# Patient Record
Sex: MALE | Race: WHITE | NOT HISPANIC OR LATINO
[De-identification: names, ages, dates, MRNs, and addresses within clinical notes are randomized per-mention and may not be internally consistent; named-entity substitution may affect disease eponyms.]

---

## 2018-06-27 ENCOUNTER — TRANSCRIPTION ENCOUNTER (OUTPATIENT)
Age: 23
End: 2018-06-27

## 2020-10-08 ENCOUNTER — TRANSCRIPTION ENCOUNTER (OUTPATIENT)
Age: 25
End: 2020-10-08

## 2021-02-18 ENCOUNTER — TRANSCRIPTION ENCOUNTER (OUTPATIENT)
Age: 26
End: 2021-02-18

## 2021-05-14 ENCOUNTER — INPATIENT (INPATIENT)
Facility: HOSPITAL | Age: 26
LOS: 7 days | Discharge: HOME HEALTH | End: 2021-05-22
Attending: INTERNAL MEDICINE | Admitting: INTERNAL MEDICINE
Payer: COMMERCIAL

## 2021-05-14 VITALS
TEMPERATURE: 101 F | OXYGEN SATURATION: 96 % | DIASTOLIC BLOOD PRESSURE: 82 MMHG | HEART RATE: 124 BPM | RESPIRATION RATE: 22 BRPM | SYSTOLIC BLOOD PRESSURE: 126 MMHG

## 2021-05-14 DIAGNOSIS — R05 COUGH: ICD-10-CM

## 2021-05-14 DIAGNOSIS — E66.9 OBESITY, UNSPECIFIED: ICD-10-CM

## 2021-05-14 DIAGNOSIS — J45.901 UNSPECIFIED ASTHMA WITH (ACUTE) EXACERBATION: ICD-10-CM

## 2021-05-14 DIAGNOSIS — J12.82 PNEUMONIA DUE TO CORONAVIRUS DISEASE 2019: ICD-10-CM

## 2021-05-14 DIAGNOSIS — U07.1 COVID-19: ICD-10-CM

## 2021-05-14 DIAGNOSIS — R76.0 RAISED ANTIBODY TITER: ICD-10-CM

## 2021-05-14 DIAGNOSIS — J96.01 ACUTE RESPIRATORY FAILURE WITH HYPOXIA: ICD-10-CM

## 2021-05-14 LAB
ALBUMIN SERPL ELPH-MCNC: 3.8 G/DL — SIGNIFICANT CHANGE UP (ref 3.5–5.2)
ALP SERPL-CCNC: 39 U/L — SIGNIFICANT CHANGE UP (ref 30–115)
ALT FLD-CCNC: 23 U/L — SIGNIFICANT CHANGE UP (ref 0–41)
ANION GAP SERPL CALC-SCNC: 14 MMOL/L — SIGNIFICANT CHANGE UP (ref 7–14)
AST SERPL-CCNC: 24 U/L — SIGNIFICANT CHANGE UP (ref 0–41)
BASE EXCESS BLDV CALC-SCNC: 0.1 MMOL/L — SIGNIFICANT CHANGE UP (ref -2–2)
BASOPHILS # BLD AUTO: 0.01 K/UL — SIGNIFICANT CHANGE UP (ref 0–0.2)
BASOPHILS NFR BLD AUTO: 0.1 % — SIGNIFICANT CHANGE UP (ref 0–1)
BILIRUB SERPL-MCNC: 0.3 MG/DL — SIGNIFICANT CHANGE UP (ref 0.2–1.2)
BUN SERPL-MCNC: 13 MG/DL — SIGNIFICANT CHANGE UP (ref 10–20)
CA-I SERPL-SCNC: 1.16 MMOL/L — SIGNIFICANT CHANGE UP (ref 1.12–1.3)
CALCIUM SERPL-MCNC: 8.8 MG/DL — SIGNIFICANT CHANGE UP (ref 8.5–10.1)
CHLORIDE SERPL-SCNC: 103 MMOL/L — SIGNIFICANT CHANGE UP (ref 98–110)
CO2 SERPL-SCNC: 22 MMOL/L — SIGNIFICANT CHANGE UP (ref 17–32)
CREAT SERPL-MCNC: 1 MG/DL — SIGNIFICANT CHANGE UP (ref 0.7–1.5)
D DIMER BLD IA.RAPID-MCNC: 144 NG/ML DDU — SIGNIFICANT CHANGE UP (ref 0–230)
EOSINOPHIL # BLD AUTO: 0 K/UL — SIGNIFICANT CHANGE UP (ref 0–0.7)
EOSINOPHIL NFR BLD AUTO: 0 % — SIGNIFICANT CHANGE UP (ref 0–8)
GAS PNL BLDV: 140 MMOL/L — SIGNIFICANT CHANGE UP (ref 136–145)
GAS PNL BLDV: SIGNIFICANT CHANGE UP
GLUCOSE SERPL-MCNC: 166 MG/DL — HIGH (ref 70–99)
HCO3 BLDV-SCNC: 25 MMOL/L — SIGNIFICANT CHANGE UP (ref 22–29)
HCT VFR BLD CALC: 42.3 % — SIGNIFICANT CHANGE UP (ref 42–52)
HCT VFR BLDA CALC: 47 % — HIGH (ref 34–44)
HGB BLD CALC-MCNC: 15.3 G/DL — SIGNIFICANT CHANGE UP (ref 14–18)
HGB BLD-MCNC: 13.9 G/DL — LOW (ref 14–18)
IMM GRANULOCYTES NFR BLD AUTO: 1.8 % — HIGH (ref 0.1–0.3)
LACTATE BLDV-MCNC: 2.3 MMOL/L — HIGH (ref 0.5–1.6)
LYMPHOCYTES # BLD AUTO: 0.9 K/UL — LOW (ref 1.2–3.4)
LYMPHOCYTES # BLD AUTO: 9.4 % — LOW (ref 20.5–51.1)
MCHC RBC-ENTMCNC: 27.6 PG — SIGNIFICANT CHANGE UP (ref 27–31)
MCHC RBC-ENTMCNC: 32.9 G/DL — SIGNIFICANT CHANGE UP (ref 32–37)
MCV RBC AUTO: 84.1 FL — SIGNIFICANT CHANGE UP (ref 80–94)
MONOCYTES # BLD AUTO: 0.39 K/UL — SIGNIFICANT CHANGE UP (ref 0.1–0.6)
MONOCYTES NFR BLD AUTO: 4.1 % — SIGNIFICANT CHANGE UP (ref 1.7–9.3)
NEUTROPHILS # BLD AUTO: 8.14 K/UL — HIGH (ref 1.4–6.5)
NEUTROPHILS NFR BLD AUTO: 84.6 % — HIGH (ref 42.2–75.2)
NRBC # BLD: 0 /100 WBCS — SIGNIFICANT CHANGE UP (ref 0–0)
NT-PROBNP SERPL-SCNC: 42 PG/ML — SIGNIFICANT CHANGE UP (ref 0–300)
PCO2 BLDV: 40 MMHG — LOW (ref 41–51)
PH BLDV: 7.4 — SIGNIFICANT CHANGE UP (ref 7.26–7.43)
PLATELET # BLD AUTO: 231 K/UL — SIGNIFICANT CHANGE UP (ref 130–400)
PO2 BLDV: 34 MMHG — SIGNIFICANT CHANGE UP (ref 20–40)
POTASSIUM BLDV-SCNC: 3.6 MMOL/L — SIGNIFICANT CHANGE UP (ref 3.3–5.6)
POTASSIUM SERPL-MCNC: 4.1 MMOL/L — SIGNIFICANT CHANGE UP (ref 3.5–5)
POTASSIUM SERPL-SCNC: 4.1 MMOL/L — SIGNIFICANT CHANGE UP (ref 3.5–5)
PROT SERPL-MCNC: 6.4 G/DL — SIGNIFICANT CHANGE UP (ref 6–8)
RBC # BLD: 5.03 M/UL — SIGNIFICANT CHANGE UP (ref 4.7–6.1)
RBC # FLD: 14 % — SIGNIFICANT CHANGE UP (ref 11.5–14.5)
SAO2 % BLDV: 65 % — SIGNIFICANT CHANGE UP
SARS-COV-2 RNA SPEC QL NAA+PROBE: DETECTED
SODIUM SERPL-SCNC: 139 MMOL/L — SIGNIFICANT CHANGE UP (ref 135–146)
WBC # BLD: 9.61 K/UL — SIGNIFICANT CHANGE UP (ref 4.8–10.8)
WBC # FLD AUTO: 9.61 K/UL — SIGNIFICANT CHANGE UP (ref 4.8–10.8)

## 2021-05-14 PROCEDURE — 71045 X-RAY EXAM CHEST 1 VIEW: CPT | Mod: 26

## 2021-05-14 PROCEDURE — 93010 ELECTROCARDIOGRAM REPORT: CPT

## 2021-05-14 PROCEDURE — 99285 EMERGENCY DEPT VISIT HI MDM: CPT

## 2021-05-14 PROCEDURE — 99223 1ST HOSP IP/OBS HIGH 75: CPT

## 2021-05-14 RX ORDER — PANTOPRAZOLE SODIUM 20 MG/1
40 TABLET, DELAYED RELEASE ORAL
Refills: 0 | Status: DISCONTINUED | OUTPATIENT
Start: 2021-05-14 | End: 2021-05-22

## 2021-05-14 RX ORDER — ALBUTEROL 90 UG/1
2 AEROSOL, METERED ORAL
Refills: 0 | Status: DISCONTINUED | OUTPATIENT
Start: 2021-05-14 | End: 2021-05-22

## 2021-05-14 RX ORDER — DEXAMETHASONE 0.5 MG/5ML
6 ELIXIR ORAL DAILY
Refills: 0 | Status: DISCONTINUED | OUTPATIENT
Start: 2021-05-14 | End: 2021-05-17

## 2021-05-14 RX ORDER — BUDESONIDE AND FORMOTEROL FUMARATE DIHYDRATE 160; 4.5 UG/1; UG/1
2 AEROSOL RESPIRATORY (INHALATION)
Refills: 0 | Status: DISCONTINUED | OUTPATIENT
Start: 2021-05-14 | End: 2021-05-22

## 2021-05-14 RX ORDER — SODIUM CHLORIDE 9 MG/ML
2000 INJECTION, SOLUTION INTRAVENOUS ONCE
Refills: 0 | Status: COMPLETED | OUTPATIENT
Start: 2021-05-14 | End: 2021-05-14

## 2021-05-14 RX ORDER — REMDESIVIR 5 MG/ML
200 INJECTION INTRAVENOUS EVERY 24 HOURS
Refills: 0 | Status: COMPLETED | OUTPATIENT
Start: 2021-05-14 | End: 2021-05-15

## 2021-05-14 RX ORDER — ENOXAPARIN SODIUM 100 MG/ML
40 INJECTION SUBCUTANEOUS DAILY
Refills: 0 | Status: DISCONTINUED | OUTPATIENT
Start: 2021-05-14 | End: 2021-05-16

## 2021-05-14 RX ORDER — CHLORHEXIDINE GLUCONATE 213 G/1000ML
1 SOLUTION TOPICAL
Refills: 0 | Status: DISCONTINUED | OUTPATIENT
Start: 2021-05-14 | End: 2021-05-22

## 2021-05-14 RX ORDER — IPRATROPIUM/ALBUTEROL SULFATE 18-103MCG
3 AEROSOL WITH ADAPTER (GRAM) INHALATION
Refills: 0 | Status: COMPLETED | OUTPATIENT
Start: 2021-05-14 | End: 2021-05-14

## 2021-05-14 RX ORDER — SODIUM CHLORIDE 9 MG/ML
1000 INJECTION INTRAMUSCULAR; INTRAVENOUS; SUBCUTANEOUS
Refills: 0 | Status: DISCONTINUED | OUTPATIENT
Start: 2021-05-14 | End: 2021-05-15

## 2021-05-14 RX ORDER — ALBUTEROL 90 UG/1
2 AEROSOL, METERED ORAL
Qty: 0 | Refills: 0 | DISCHARGE

## 2021-05-14 RX ORDER — REMDESIVIR 5 MG/ML
INJECTION INTRAVENOUS
Refills: 0 | Status: COMPLETED | OUTPATIENT
Start: 2021-05-14 | End: 2021-05-19

## 2021-05-14 RX ADMIN — Medication 3 MILLILITER(S): at 15:32

## 2021-05-14 RX ADMIN — SODIUM CHLORIDE 2000 MILLILITER(S): 9 INJECTION, SOLUTION INTRAVENOUS at 15:32

## 2021-05-14 RX ADMIN — Medication 125 MILLIGRAM(S): at 15:32

## 2021-05-14 NOTE — H&P ADULT - HISTORY OF PRESENT ILLNESS
25 years old male with a pmhx of asthma, on day 9 of Covid symptoms presents today for eval of worsening SOB x 4 days. Pt has been using rescue inhaler as needed with minimal to no relief. Pt denies fever, chills, weakness, numbness, CP, ABD Pain, n/v/d.    In the ED, temp 101.4, , /82, RR 22 saturating well on room air. Before being discharged from ED, [id number] desaturated to 86% placed on 6L NC. Labs WNL, covid +, to be admitted under medicine.  25 years old male with a pmhx of asthma, on day 9 of Covid symptoms presents today for eval of worsening SOB for past 4 days.   History of presenting illness goes back to 1.5 weeks ago when patient developed fever, highets temp recored 101.2, pt underwent covid testing and was found to be COVID + last week. For past four days has developed progressively worsening SOB, which is imppacting ADL prompting the patient to visit ED. Also reports chills, myalgias and body aches. Pt has been using rescue inhaler as needed with minimal to no relief. Pt denies fever, chills, weakness, numbness, CP, ABD Pain, n/v/d.    In the ED, temp 101.4, , /82, RR 22 saturating well on room air. Before being discharged from ED, [id number] desaturated to 86% placed on 6L NC. Labs WNL, covid +, to be admitted under medicine.

## 2021-05-14 NOTE — ED PROVIDER NOTE - NS ED ROS FT
Eyes:  No visual changes, eye pain or discharge.  ENMT:  No hearing changes, pain, discharge or infections. No neck pain or stiffness.  Cardiac:  No chest pain, SOB or edema. No chest pain with exertion.  Respiratory:  + asthma + SOB + Cough  GI:  No nausea, vomiting, diarrhea or abdominal pain.  :  No dysuria, frequency or burning.  MS:  No myalgia, muscle weakness, joint pain or back pain.  Neuro:  No headache or weakness.  No LOC.  Skin:  No skin rash.   Endocrine: No history of thyroid disease or diabetes.  Except as documented in the HPI,  all other systems are negative.

## 2021-05-14 NOTE — ED PROVIDER NOTE - OBJECTIVE STATEMENT
Pt is a 24y/o male with a pmhx of asthma, on day 9 of Covid symptoms presents today for eval of worsening SOB x 4 days. Pt has been using rescue inhaler as needed with minimal to no relief. Pt denies fever, chills, weakness, numbness, CP, ABD Pain, n/v/d.

## 2021-05-14 NOTE — ED PROVIDER NOTE - CLINICAL SUMMARY MEDICAL DECISION MAKING FREE TEXT BOX
25 y.o M w/ pmhx asthma and currently with known COVID PNA p/w worsening SOB x 4 days concerning for viral induced asthma exacerbation vs hypoxemic respiratory failure 2/2 COVID PNA. Initially pt treated for asthma exacerbation with solu-medrol and duonebs. After 3 nebs pt feeling better but pulse ox showing <90% on RA. Pt placed on 6 L O2 and now at 91%. In light of CXR and symptoms with signs of hypoexmia, will draw labs and admit pt for worsening covid PNA. Pt amenable to plan. Pt endorsed to admitting team.

## 2021-05-14 NOTE — H&P ADULT - ATTENDING COMMENTS
HPI:  25 years old male with a pmhx of asthma, on day 9 of Covid symptoms presents today for eval of worsening SOB for past 4 days.   History of presenting illness goes back to 1.5 weeks ago when patient developed fever, highets temp recored 101.2, pt underwent covid testing and was found to be COVID + last week. For past four days has developed progressively worsening SOB, which is imppacting ADL prompting the patient to visit ED. Also reports chills, myalgias and body aches. Pt has been using rescue inhaler as needed with minimal to no relief. Pt denies fever, chills, weakness, numbness, CP, ABD Pain, n/v/d.    In the ED, temp 101.4, , /82, RR 22 saturating well on room air. Before being discharged from ED, [id number] desaturated to 86% placed on 6L NC. Labs WNL, covid +, to be admitted under medicine.  (14 May 2021 21:00)    REVIEW OF SYSTEMS: see cc/HPI   CONSTITUTIONAL: No weakness, fevers or chills  EYES/ENT: No visual changes;  No vertigo or throat pain   NECK: No pain or stiffness  RESPIRATORY: No cough, wheezing, hemoptysis; No shortness of breath  CARDIOVASCULAR: No chest pain or palpitations  GASTROINTESTINAL: No abdominal or epigastric pain. No nausea, vomiting, or hematemesis; No diarrhea or constipation. No melena or hematochezia.  GENITOURINARY: No dysuria, frequency or hematuria  NEUROLOGICAL: No numbness or weakness  SKIN: No itching, rashes    Physical Exam:  General: WN/WD NAD  Neurology: A&Ox3, nonfocal, follows commands  Eyes: PERRLA/ EOMI  ENT/Neck: Neck supple, trachea midline, No JVD  Respiratory: CTA B/L, No wheezing, rales, rhonchi  CV: Normal rate regular rhythm, S1S2, no murmurs, rubs or gallops  Abdominal: Soft, NT, ND +BS,   Extremities: No edema, + peripheral pulses  Skin: No Rashes, Hematoma, Ecchymosis  Incisions:   Tubes:  A/p  Severe COVID-19 infection   Acute resp. failure w/ hypoxia 2/2 COVID-19   Asthma exacerbation   -admit to floor   -O2 via NC or Hi flow PRN   - pulse ox monitoring   -IV decadron and remdesivir   -fever control / Tylenol   -send inflammatory marker   -ID eval   -c/w inhaler     DVT prophylaxis HPI:  25 years old male with a pmhx of asthma, on day 9 of Covid symptoms presents today for eval of worsening SOB for past 4 days.   History of presenting illness goes back to 1.5 weeks ago when patient developed fever, highets temp recored 101.2, pt underwent covid testing and was found to be COVID + last week. For past four days has developed progressively worsening SOB, which is imppacting ADL prompting the patient to visit ED. Also reports chills, myalgias and body aches. Pt has been using rescue inhaler as needed with minimal to no relief. Pt denies fever, chills, weakness, numbness, CP, ABD Pain, n/v/d.    In the ED, temp 101.4, , /82, RR 22 saturating well on room air. Before being discharged from ED, [id number] desaturated to 86% placed on 6L NC. Labs WNL, covid +, to be admitted under medicine.  (14 May 2021 21:00)    REVIEW OF SYSTEMS: see cc/HPI   CONSTITUTIONAL: No weakness, fevers or chills  EYES/ENT: No visual changes;  No vertigo or throat pain   NECK: No pain or stiffness  RESPIRATORY: (+) cough, (+) wheezing, NO hemoptysis; (+) shortness of breath  CARDIOVASCULAR: No chest pain or palpitations  GASTROINTESTINAL: No abdominal or epigastric pain. No nausea, vomiting, or hematemesis; No diarrhea or constipation. No melena or hematochezia.  GENITOURINARY: No dysuria, frequency or hematuria  NEUROLOGICAL: No numbness or weakness  SKIN: No itching, rashes    Physical Exam:  General: WN/WD NAD  Neurology: A&Ox3, nonfocal, follows commands  Eyes: PERRLA/ EOMI  ENT/Neck: Neck supple, trachea midline, No JVD  Respiratory: B/L scattered wheezing, NO rales, rhonchi, (+) coughing   CV: Normal rate regular rhythm, S1S2, no murmurs, rubs or gallops  Abdominal: Soft, NT, ND +BS,   Extremities: No edema, + peripheral pulses  Skin: No Rashes, Hematoma, Ecchymosis  Incisions:   Tubes:  A/p  Severe COVID-19 infection   Acute resp. failure w/ hypoxia 2/2 COVID-19   Asthma exacerbation   -admit to floor   -O2 via NC or Hi flow PRN   - pulse ox monitoring   -IV decadron and Remdesivir   -fever control / Tylenol   -send inflammatory marker   -ID eval   -c/w inhaler     DVT prophylaxis    Seen 5/14/21

## 2021-05-14 NOTE — H&P ADULT - NSHPPHYSICALEXAM_GEN_ALL_CORE
PHYSICAL EXAM:  GENERAL: NAD, lying in bed comfortably  HEAD:  Atraumatic, Normocephalic  EYES: EOMI, PERRLA, conjunctiva and sclera clear  ENT: Moist mucous membranes  NECK: Supple, No JVD  CHEST/LUNG: Clear to auscultation bilaterally; No rales, rhonchi, wheezing, or rubs. Unlabored respirations  HEART: Regular rate and rhythm; No murmurs, rubs, or gallops  ABDOMEN: Bowel sounds present; Soft, Nontender, Nondistended. No hepatomegally  EXTREMITIES:  2+ Peripheral Pulses, brisk capillary refill. No clubbing, cyanosis, or edema  NERVOUS SYSTEM:  Alert & Oriented X3, speech clear. No deficits   MSK: FROM all 4 extremities, full and equal strength  SKIN: No rashes or lesions PHYSICAL EXAM:  GENERAL: NAD, lying in bed comfortably  HEAD:  Atraumatic, Normocephalic  EYES: EOMI, PERRLA, conjunctiva and sclera clear  ENT: Moist mucous membranes  NECK: Supple, No JVD  CHEST/LUNG: mild wheezing b/l  HEART: Regular rate and rhythm; No murmurs, rubs, or gallops  ABDOMEN: Bowel sounds present; Soft, Nontender, Nondistended. No hepatomegally  EXTREMITIES:  2+ Peripheral Pulses, brisk capillary refill. No clubbing, cyanosis, or edema  NERVOUS SYSTEM:  Alert & Oriented X3, speech clear. No deficits   MSK: FROM all 4 extremities, full and equal strength  SKIN: No rashes or lesions

## 2021-05-14 NOTE — ED ADULT NURSE NOTE - NSIMPLEMENTINTERV_GEN_ALL_ED
Implemented All Universal Safety Interventions:  [place] to call system. Call bell, personal items and telephone within reach. Instruct patient to call for assistance. Room bathroom lighting operational. Non-slip footwear when patient is off stretcher. Physically safe environment: no spills, clutter or unnecessary equipment. Stretcher in lowest position, wheels locked, appropriate side rails in place.

## 2021-05-14 NOTE — ED ADULT NURSE REASSESSMENT NOTE - NS ED NURSE REASSESS COMMENT FT1
Received the pt from the previous shift. A and O x3, sitting on the chair, NC 6 L , Sat 93%. No acute distress noted. Fall precautions and safety measurements maintained.

## 2021-05-14 NOTE — H&P ADULT - ASSESSMENT
Impression:  Severe Covid PNA  Allyson exacerbation    # SOB / moderate - severe COVID - 19 PNA  - CXR shows b/l pulmonary opacities  - D-Dimer and LDH WNL for now  - monitor O2, titrate O2 as needed to goal of 92 - 96%  - decadron 6mg IV upto 10 days  - remdesivir 200mg IV x 1 day followed by 100mg IV x 4 days  - empiric abx if sepsis  - anticoagulation  - tylenol for fever  - toci? plasma if less than 7 days from symptoms onset.   - trend D-Dimer, CRP, LDH, ferritin     # Diet: Regular  # DVT Prophylaxis: Lovenox  # GI Prophylaxis::  # Activity: IAT  # IV Fluids:  # Dispo: Acute  # Code Status: Fullcode  # CHG wash  25 years old male with a pmhx of asthma, on day 9 of Covid symptoms presents today for eval of worsening SOB for past 4 days.     Impression:  Severe Covid PNA  Acute Asthma exacerbation    # SOB / moderate - severe COVID - 19 PNA  # Acute asthma exacerbation  - CXR shows b/l pulmonary opacities  - D-Dimer WNL for now  - monitor O2, titrate O2 as needed to goal of 92 - 96%  - decadron 6mg IV upto 10 days  - remdesivir 200mg IV x 1 day followed by 100mg IV x 4 days  - anticoagulation: lovenox 40 sq qd  - tylenol for fever  - consult ID for toci vs plasma? check covid ab levels  - c/w inhalers  - trend D-Dimer, CRP, LDH, ferritin     # Diet: Regular  # DVT Prophylaxis: Lovenox  # GI Prophylaxis: Protonix  # Activity: IAT  # IV Fluids: NS @ 75cc  # Dispo: Acute  # Code Status: Fullcode

## 2021-05-14 NOTE — ED PROVIDER NOTE - PHYSICAL EXAMINATION
VITAL SIGNS: I have reviewed nursing notes and confirm.  CONSTITUTIONAL: Well-developed; well-nourished; in no acute distress.   SKIN: skin exam is warm and dry, no acute rash.    HEAD: Normocephalic; atraumatic.  EYES: conjunctiva and sclera clear.  ENT: No nasal discharge; airway clear.  NECK: Supple; non tender.  CARD: S1, S2 normal; no murmurs, gallops, or rubs. Regular rate and rhythm.   RESP: + wheezes diffusely   ABD: Normal bowel sounds; soft; non-distended; non-tender  EXT: Normal ROM.  No clubbing, cyanosis or edema.   LYMPH: No acute cervical adenopathy.  NEURO: Alert, oriented, grossly unremarkable  PSYCH: Cooperative, appropriate.

## 2021-05-14 NOTE — ED PROVIDER NOTE - CARE PLAN
Principal Discharge DX:	COVID-19  Secondary Diagnosis:	Dyspnea  Secondary Diagnosis:	Asthma   Principal Discharge DX:	Acute respiratory failure with hypoxia  Secondary Diagnosis:	Dyspnea  Secondary Diagnosis:	Asthma  Secondary Diagnosis:	COVID-19

## 2021-05-14 NOTE — H&P ADULT - NSHPLABSRESULTS_GEN_ALL_CORE
(05-14 @ 18:10)                      13.9  9.61 )-----------( 231                 42.3    Neutrophils = 8.14 (84.6%)  Lymphocytes = 0.90 (9.4%)  Eosinophils = 0.00 (0.0%)  Basophils = 0.01 (0.1%)  Monocytes = 0.39 (4.1%)  Bands = --%    05-14    139  |  103  |  13  ----------------------------<  166<H>  4.1   |  22  |  1.0    Ca    8.8      14 May 2021 18:10    TPro  6.4  /  Alb  3.8  /  TBili  0.3  /  DBili  x   /  AST  24  /  ALT  23  /  AlkPhos  39  05-14          RVP:    Venous Blood Gas:  05-14 @ 18:16  7.40/40/34/25/65  VBG Lactate: 2.3        Tox:

## 2021-05-14 NOTE — ED PROVIDER NOTE - PROGRESS NOTE DETAILS
pt given 3 Combivent with minimal relief, now  requiring 6l nc, currently saturating at 95%, HR improved, will admit to medicine discussed case fiorella CARRERO, Will admit

## 2021-05-14 NOTE — ED PROVIDER NOTE - ATTENDING CONTRIBUTION TO CARE
25 y.o M w/ pmhx asthma p/w worsening SOB x 4 days. Pt was recently diagnosed with COVID and is on day 9 of viral syndrome. SOB similar to his prior asthma exacerbations which he has been treating with albuterol with minimal relief. No + fever, no CP, no n/v, + cough, no dizziness, no abd pain, no urinary symptoms, no constipation or diarrhea.    VS reviewed  CONSTITUTIONAL: well-appearing with slight effort in breathing.  SKIN: Warm dry, normal skin turgor  HEAD: NCAT  EYES: EOMI, PERRL, no scleral icterus, conjunctiva pink  ENT: normal pharynx with no erythema or exudates  NECK: Supple; non tender. Full ROM.  CARD: tachy, no murmurs.  RESP: L sided diffuse crackles. Slightly tachypneic to low 20s, saturating 91% on RA.  ABD: soft, non-tender, non-distended, no rebound or guarding.  EXT: Full ROM, no bony tenderness, no pedal edema, no calf tenderness  NEURO: normal motor. normal sensory. Normal gait.  PSYCH: Cooperative, appropriate.    Plan: steroids, duoneb and reassess. I personally evaluated the patient. I reviewed the Resident’s or Physician Assistant’s note (as assigned above), and agree with the findings and plan except as documented in my note.    25 y.o M w/ pmhx asthma p/w worsening SOB x 4 days. Pt was recently diagnosed with COVID and is on day 9 of viral syndrome. SOB similar to his prior asthma exacerbations which he has been treating with albuterol with minimal relief. No + fever, no CP, no n/v, + cough, no dizziness, no abd pain, no urinary symptoms, no constipation or diarrhea.    VS reviewed  CONSTITUTIONAL: well-appearing with slight effort in breathing.  SKIN: Warm dry, normal skin turgor  HEAD: NCAT  EYES: EOMI, PERRL, no scleral icterus, conjunctiva pink  ENT: normal pharynx with no erythema or exudates  NECK: Supple; non tender. Full ROM.  CARD: tachy, no murmurs.  RESP: L sided diffuse crackles. Slightly tachypneic to low 20s, saturating 91% on RA.  ABD: soft, non-tender, non-distended, no rebound or guarding.  EXT: Full ROM, no bony tenderness, no pedal edema, no calf tenderness  NEURO: normal motor. normal sensory. Normal gait.  PSYCH: Cooperative, appropriate.    Plan: steroids, duoneb and reassess.

## 2021-05-15 LAB
A1C WITH ESTIMATED AVERAGE GLUCOSE RESULT: 5.9 % — HIGH (ref 4–5.6)
ALBUMIN SERPL ELPH-MCNC: 3.7 G/DL — SIGNIFICANT CHANGE UP (ref 3.5–5.2)
ALP SERPL-CCNC: 40 U/L — SIGNIFICANT CHANGE UP (ref 30–115)
ALT FLD-CCNC: 22 U/L — SIGNIFICANT CHANGE UP (ref 0–41)
ANION GAP SERPL CALC-SCNC: 14 MMOL/L — SIGNIFICANT CHANGE UP (ref 7–14)
APTT BLD: 28.4 SEC — SIGNIFICANT CHANGE UP (ref 27–39.2)
AST SERPL-CCNC: 19 U/L — SIGNIFICANT CHANGE UP (ref 0–41)
BASOPHILS # BLD AUTO: 0.02 K/UL — SIGNIFICANT CHANGE UP (ref 0–0.2)
BASOPHILS NFR BLD AUTO: 0.2 % — SIGNIFICANT CHANGE UP (ref 0–1)
BILIRUB SERPL-MCNC: 0.3 MG/DL — SIGNIFICANT CHANGE UP (ref 0.2–1.2)
BUN SERPL-MCNC: 14 MG/DL — SIGNIFICANT CHANGE UP (ref 10–20)
CALCIUM SERPL-MCNC: 8.8 MG/DL — SIGNIFICANT CHANGE UP (ref 8.5–10.1)
CHLORIDE SERPL-SCNC: 102 MMOL/L — SIGNIFICANT CHANGE UP (ref 98–110)
CHOLEST SERPL-MCNC: 148 MG/DL — SIGNIFICANT CHANGE UP
CO2 SERPL-SCNC: 22 MMOL/L — SIGNIFICANT CHANGE UP (ref 17–32)
COVID-19 SPIKE DOMAIN AB INTERP: POSITIVE
COVID-19 SPIKE DOMAIN ANTIBODY RESULT: 2.15 U/ML — HIGH
CREAT SERPL-MCNC: 0.8 MG/DL — SIGNIFICANT CHANGE UP (ref 0.7–1.5)
CRP SERPL-MCNC: 114 MG/L — HIGH
CRP SERPL-MCNC: 80 MG/L — HIGH
D DIMER BLD IA.RAPID-MCNC: 222 NG/ML DDU — SIGNIFICANT CHANGE UP (ref 0–230)
EOSINOPHIL # BLD AUTO: 0 K/UL — SIGNIFICANT CHANGE UP (ref 0–0.7)
EOSINOPHIL NFR BLD AUTO: 0 % — SIGNIFICANT CHANGE UP (ref 0–8)
ESTIMATED AVERAGE GLUCOSE: 123 MG/DL — HIGH (ref 68–114)
FERRITIN SERPL-MCNC: 508 NG/ML — HIGH (ref 30–400)
FOLATE SERPL-MCNC: 12 NG/ML — SIGNIFICANT CHANGE UP
GLUCOSE SERPL-MCNC: 168 MG/DL — HIGH (ref 70–99)
HCT VFR BLD CALC: 40.7 % — LOW (ref 42–52)
HDLC SERPL-MCNC: 26 MG/DL — LOW
HGB BLD-MCNC: 13.7 G/DL — LOW (ref 14–18)
IMM GRANULOCYTES NFR BLD AUTO: 1.8 % — HIGH (ref 0.1–0.3)
INR BLD: 1.37 RATIO — HIGH (ref 0.65–1.3)
LACTATE SERPL-SCNC: 2.1 MMOL/L — HIGH (ref 0.7–2)
LIPID PNL WITH DIRECT LDL SERPL: 95 MG/DL — SIGNIFICANT CHANGE UP
LYMPHOCYTES # BLD AUTO: 1.01 K/UL — LOW (ref 1.2–3.4)
LYMPHOCYTES # BLD AUTO: 9.1 % — LOW (ref 20.5–51.1)
MAGNESIUM SERPL-MCNC: 1.8 MG/DL — SIGNIFICANT CHANGE UP (ref 1.8–2.4)
MCHC RBC-ENTMCNC: 28.4 PG — SIGNIFICANT CHANGE UP (ref 27–31)
MCHC RBC-ENTMCNC: 33.7 G/DL — SIGNIFICANT CHANGE UP (ref 32–37)
MCV RBC AUTO: 84.4 FL — SIGNIFICANT CHANGE UP (ref 80–94)
MONOCYTES # BLD AUTO: 0.49 K/UL — SIGNIFICANT CHANGE UP (ref 0.1–0.6)
MONOCYTES NFR BLD AUTO: 4.4 % — SIGNIFICANT CHANGE UP (ref 1.7–9.3)
NEUTROPHILS # BLD AUTO: 9.38 K/UL — HIGH (ref 1.4–6.5)
NEUTROPHILS NFR BLD AUTO: 84.5 % — HIGH (ref 42.2–75.2)
NON HDL CHOLESTEROL: 122 MG/DL — SIGNIFICANT CHANGE UP
NRBC # BLD: 0 /100 WBCS — SIGNIFICANT CHANGE UP (ref 0–0)
PLATELET # BLD AUTO: 233 K/UL — SIGNIFICANT CHANGE UP (ref 130–400)
POTASSIUM SERPL-MCNC: 3.9 MMOL/L — SIGNIFICANT CHANGE UP (ref 3.5–5)
POTASSIUM SERPL-SCNC: 3.9 MMOL/L — SIGNIFICANT CHANGE UP (ref 3.5–5)
PROCALCITONIN SERPL-MCNC: 0.12 NG/ML — HIGH (ref 0.02–0.1)
PROCALCITONIN SERPL-MCNC: 0.13 NG/ML — HIGH (ref 0.02–0.1)
PROT SERPL-MCNC: 6.2 G/DL — SIGNIFICANT CHANGE UP (ref 6–8)
PROTHROM AB SERPL-ACNC: 15.8 SEC — HIGH (ref 9.95–12.87)
RBC # BLD: 4.82 M/UL — SIGNIFICANT CHANGE UP (ref 4.7–6.1)
RBC # FLD: 13.9 % — SIGNIFICANT CHANGE UP (ref 11.5–14.5)
SARS-COV-2 IGG+IGM SERPL QL IA: 2.15 U/ML — HIGH
SARS-COV-2 IGG+IGM SERPL QL IA: POSITIVE
SODIUM SERPL-SCNC: 138 MMOL/L — SIGNIFICANT CHANGE UP (ref 135–146)
TRIGL SERPL-MCNC: 117 MG/DL — SIGNIFICANT CHANGE UP
TROPONIN T SERPL-MCNC: <0.01 NG/ML — SIGNIFICANT CHANGE UP
TSH SERPL-MCNC: 0.17 UIU/ML — LOW (ref 0.27–4.2)
VIT B12 SERPL-MCNC: 531 PG/ML — SIGNIFICANT CHANGE UP (ref 232–1245)
WBC # BLD: 11.1 K/UL — HIGH (ref 4.8–10.8)
WBC # FLD AUTO: 11.1 K/UL — HIGH (ref 4.8–10.8)

## 2021-05-15 PROCEDURE — 71045 X-RAY EXAM CHEST 1 VIEW: CPT | Mod: 26

## 2021-05-15 PROCEDURE — 99232 SBSQ HOSP IP/OBS MODERATE 35: CPT

## 2021-05-15 RX ORDER — ACETAMINOPHEN 500 MG
650 TABLET ORAL EVERY 6 HOURS
Refills: 0 | Status: DISCONTINUED | OUTPATIENT
Start: 2021-05-15 | End: 2021-05-22

## 2021-05-15 RX ORDER — REMDESIVIR 5 MG/ML
100 INJECTION INTRAVENOUS EVERY 24 HOURS
Refills: 0 | Status: COMPLETED | OUTPATIENT
Start: 2021-05-16 | End: 2021-05-19

## 2021-05-15 RX ORDER — IBUPROFEN 200 MG
400 TABLET ORAL EVERY 6 HOURS
Refills: 0 | Status: DISCONTINUED | OUTPATIENT
Start: 2021-05-15 | End: 2021-05-15

## 2021-05-15 RX ORDER — GUAIFENESIN/DEXTROMETHORPHAN 600MG-30MG
10 TABLET, EXTENDED RELEASE 12 HR ORAL EVERY 6 HOURS
Refills: 0 | Status: COMPLETED | OUTPATIENT
Start: 2021-05-15 | End: 2021-05-20

## 2021-05-15 RX ORDER — IBUPROFEN 200 MG
600 TABLET ORAL ONCE
Refills: 0 | Status: COMPLETED | OUTPATIENT
Start: 2021-05-15 | End: 2021-05-15

## 2021-05-15 RX ADMIN — ENOXAPARIN SODIUM 40 MILLIGRAM(S): 100 INJECTION SUBCUTANEOUS at 11:00

## 2021-05-15 RX ADMIN — Medication 10 MILLILITER(S): at 09:25

## 2021-05-15 RX ADMIN — Medication 650 MILLIGRAM(S): at 21:43

## 2021-05-15 RX ADMIN — Medication 650 MILLIGRAM(S): at 08:06

## 2021-05-15 RX ADMIN — Medication 6 MILLIGRAM(S): at 05:06

## 2021-05-15 RX ADMIN — PANTOPRAZOLE SODIUM 40 MILLIGRAM(S): 20 TABLET, DELAYED RELEASE ORAL at 05:07

## 2021-05-15 RX ADMIN — Medication 650 MILLIGRAM(S): at 02:10

## 2021-05-15 RX ADMIN — Medication 10 MILLILITER(S): at 17:19

## 2021-05-15 RX ADMIN — BUDESONIDE AND FORMOTEROL FUMARATE DIHYDRATE 2 PUFF(S): 160; 4.5 AEROSOL RESPIRATORY (INHALATION) at 08:13

## 2021-05-15 RX ADMIN — Medication 600 MILLIGRAM(S): at 09:25

## 2021-05-15 RX ADMIN — Medication 650 MILLIGRAM(S): at 08:56

## 2021-05-15 RX ADMIN — SODIUM CHLORIDE 75 MILLILITER(S): 9 INJECTION INTRAMUSCULAR; INTRAVENOUS; SUBCUTANEOUS at 00:41

## 2021-05-15 RX ADMIN — Medication 650 MILLIGRAM(S): at 01:15

## 2021-05-15 RX ADMIN — REMDESIVIR 500 MILLIGRAM(S): 5 INJECTION INTRAVENOUS at 05:07

## 2021-05-15 NOTE — CONSULT NOTE ADULT - ASSESSMENT
IMPRESSION:  Acute hypoxemic respiratory failure  COVID-19 PNA  Doubt superimposed bacterial infection  Doubt volume overload  Doubt DVT/ PE    PLAN:    CNS: Avoid CNS depressants.     HEENT: Oral care    PULMONARY:  HOB @ 45 degrees.  Aspiration precautions. Target SpO2 92-96%, wean oxygen as tolerated. Continue decadron.     CARDIOVASCULAR: Avoid volume overload.     GI: GI prophylaxis. Feeding as tolerated.  Bowel regimen     RENAL: Follow up lytes. Correct as needed.     INFECTIOUS DISEASE: PanCx. Nasal MRSA, urine strep/ legionella. Trend inflammatory markers. FU ID.     HEMATOLOGICAL:  DVT prophylaxis.     ENDOCRINE:  Follow up FS.     MUSCULOSKELETAL: bedrest     Step Down Unit monitoring for now IMPRESSION:  Acute hypoxemic respiratory failure  COVID-19 PNA  Doubt superimposed bacterial infection  HO Obesity  HO Asthma, stable  Doubt volume overload  Doubt DVT/ PE    PLAN:    CNS: Avoid CNS depressants.     HEENT: Oral care    PULMONARY:  HOB @ 45 degrees.  Aspiration precautions. Target SpO2 92-96%, wean oxygen as tolerated. Continue decadron. Duonebs PRN.     CARDIOVASCULAR: Avoid volume overload.     GI: GI prophylaxis. Feeding as tolerated.  Bowel regimen     RENAL: Follow up lytes. Correct as needed.     INFECTIOUS DISEASE: PanCx. Nasal MRSA, urine strep/ legionella. Trend inflammatory markers. FU ID.     HEMATOLOGICAL:  DVT prophylaxis.     ENDOCRINE:  Follow up FS.     MUSCULOSKELETAL: bedrest     MICU monitoring

## 2021-05-15 NOTE — PROGRESS NOTE ADULT - ATTENDING COMMENTS
Chart reviewed.  Patient examined and discussed with housestaff.  F/up of this 24 yo M with hx of asthma currently admitted and being txed for severe covid-19 PNA with acute hypoxic respiratory failure and b/l infiltrates on CXR along with increase ESR / CRP.  + Fever noted and currently on 100% NRB mask with oxygen saturation in the low 90's.  Pt though does not look to be in distress.  PE as noted above.  Labs / CXR reviewed and discussed.  Agree with transfer to ICU for closer monitoring.  Appreciate ID and pulm input.  To continue IV Dexa and Remdesivir.  Resp monitoring.  DVT prophylaxis.  F/up abn labs.

## 2021-05-15 NOTE — CONSULT NOTE ADULT - SUBJECTIVE AND OBJECTIVE BOX
Patient is a 25y old  Male who presents with a chief complaint of Shortness of breath (15 May 2021 05:39)      HPI:  25 years old male with a pmhx of asthma, on day 9 of Covid symptoms presents today for eval of worsening SOB for past 4 days.   History of presenting illness goes back to 1.5 weeks ago when patient developed fever, highets temp recored 101.2, pt underwent covid testing and was found to be COVID + last week. For past four days has developed progressively worsening SOB, which is imppacting ADL prompting the patient to visit ED. Also reports chills, myalgias and body aches. Pt has been using rescue inhaler as needed with minimal to no relief. Pt denies fever, chills, weakness, numbness, CP, ABD Pain, n/v/d.    In the ED, temp 101.4, , /82, RR 22 saturating well on room air. Before being discharged from ED, [id number] desaturated to 86% placed on 6L NC. Labs WNL, covid +, to be admitted under medicine.  (14 May 2021 21:00)      PAST MEDICAL & SURGICAL HISTORY:  Asthma      FAMILY HISTORY:  :  No known cardiovacular family hisotry     Review Of Systems:     All ROS are negative except per HPI       Allergies    No Known Allergies    Intolerances          PHYSICAL EXAM    ICU Vital Signs Last 24 Hrs  T(C): 38 (15 May 2021 08:05), Max: 39 (15 May 2021 01:11)  T(F): 100.4 (15 May 2021 08:05), Max: 102.2 (15 May 2021 01:11)  HR: 107 (15 May 2021 05:39) (106 - 127)  BP: 146/67 (15 May 2021 05:39) (121/60 - 146/67)  RR: 20 (15 May 2021 05:39) (20 - 22)  SpO2: 94% (15 May 2021 08:05) (90% - 96%)      CONSTITUTIONAL:  Well nourished.  NAD    ENT:   Airway patent,   Mouth with normal mucosa.   No thrush    EYES:   pupils equal,   round and reactive to light.    CARDIAC:   Normal rate,   Regular rhythm.    Heart sounds S1, S2.     RESPIRATORY:   NRB  No wheezing   Normal chest expansion  No use of accessory muscles    GASTROINTESTINAL:  Abdomen soft   Non-tender,   No guarding,   + BS    MUSCULOSKELETAL:   Range of motion is not limited,  No clubbing, cyanosis    NEUROLOGICAL:   Alert and oriented   No motor or sensory deficits.  Pertinent DTRs normal    SKIN:   Skin normal color for race,   Warm and dry  No evidence of rash.    PSYCHIATRIC:   Normal mood and affect.   No apparent risk to self or others.        LABS:                          13.9   9.61  )-----------( 231      ( 14 May 2021 18:10 )             42.3                                               05-14    139  |  103  |  13  ----------------------------<  166<H>  4.1   |  22  |  1.0    Ca    8.8      14 May 2021 18:10    TPro  6.4  /  Alb  3.8  /  TBili  0.3  /  DBili  x   /  AST  24  /  ALT  23  /  AlkPhos  39  05-14                                                                                           LIVER FUNCTIONS - ( 14 May 2021 18:10 )  Alb: 3.8 g/dL / Pro: 6.4 g/dL / ALK PHOS: 39 U/L / ALT: 23 U/L / AST: 24 U/L / GGT: x                                                                                                                                       X-Rays reviewed:                                                                                    ECHO    CXR interpreted by me: bilateral opacities    MEDICATIONS  (STANDING):  albuterol/ipratropium for Nebulization.. 3 milliLiter(s) Nebulizer every 20 minutes  budesonide  80 MICROgram(s)/formoterol 4.5 MICROgram(s) Inhaler 2 Puff(s) Inhalation two times a day  chlorhexidine 4% Liquid 1 Application(s) Topical <User Schedule>  dexAMETHasone  Injectable 6 milliGRAM(s) IV Push daily  enoxaparin Injectable 40 milliGRAM(s) SubCutaneous daily  pantoprazole    Tablet 40 milliGRAM(s) Oral before breakfast  remdesivir  IVPB   IV Intermittent   sodium chloride 0.9%. 1000 milliLiter(s) (75 mL/Hr) IV Continuous <Continuous>    MEDICATIONS  (PRN):  acetaminophen   Tablet .. 650 milliGRAM(s) Oral every 6 hours PRN Temp greater or equal to 38C (100.4F), Moderate Pain (4 - 6)  ALBUTerol  90 MICROgram(s) HFA Inhaler - Peds 2 Puff(s) Inhalation every 2 hours PRN Bronchospasm

## 2021-05-15 NOTE — PROGRESS NOTE ADULT - ASSESSMENT
25 years old male with a pmhx of asthma, on day 9 of Covid symptoms presents today for eval of worsening SOB for past 4 days.     Impression:  Severe Covid PNA  Acute Asthma exacerbation    # SOB / moderate - severe COVID - 19 PNA  # Acute asthma exacerbation  - CXR shows b/l pulmonary opacities  - repeat D-Dimer 222, ferritin 508, CRP 80, Procal 0.13 noted.  - now on 10L NRB saturating 94%  - monitor O2, titrate O2 as needed to goal of 92 - 96%  - decadron 6mg IV upto 10 days  - remdesivir 200mg IV x 1 day followed by 100mg IV x 4 days  - anticoagulation: lovenox 40 sq qd  - tylenol for fever  - seen by ID, not candidate for Toci for now. COVID antibody pending.  - c/w inhalers  - trend D-Dimer, CRP, LDH, ferritin   - seen pulm, approved for MICU upgrade by Dr. Li    # Diet: Regular  # DVT Prophylaxis: Lovenox  # GI Prophylaxis: Protonix  # Activity: IAT  # IV Fluids: NS @ 75cc d/karen.  # Dispo: Acute  # Code Status: Fullcode

## 2021-05-15 NOTE — CONSULT NOTE ADULT - SUBJECTIVE AND OBJECTIVE BOX
BRUNILDAKENYETTA ART  25y, Male  Allergy: No Known Allergies      All historical available data reviewed.    HPI:  25 years old male with a pmhx of asthma, on day 9 of Covid symptoms presents today for eval of worsening SOB for past 4 days.   History of presenting illness goes back to 1.5 weeks ago when patient developed fever, highets temp recored 101.2, pt underwent covid testing and was found to be COVID + last week. For past four days has developed progressively worsening SOB, which is imppacting ADL prompting the patient to visit ED. Also reports chills, myalgias and body aches. Pt has been using rescue inhaler as needed with minimal to no relief. Pt denies fever, chills, weakness, numbness, CP, ABD Pain, n/v/d.    In the ED, temp 101.4, , /82, RR 22 saturating well on room air. Before being discharged from ED, [id number] desaturated to 86% placed on 6L NC. Labs WNL, covid +, to be admitted under medicine.  (14 May 2021 21:00)    FAMILY HISTORY:    PAST MEDICAL & SURGICAL HISTORY:  Asthma          VITALS:  T(F): 100.2, Max: 102.2 (05-15-21 @ 01:11)  HR: 107  BP: 146/67  RR: 20Vital Signs Last 24 Hrs  T(C): 37.9 (15 May 2021 05:39), Max: 39 (15 May 2021 01:11)  T(F): 100.2 (15 May 2021 05:39), Max: 102.2 (15 May 2021 01:11)  HR: 107 (15 May 2021 05:39) (106 - 127)  BP: 146/67 (15 May 2021 05:39) (121/60 - 146/67)  BP(mean): --  RR: 20 (15 May 2021 05:39) (20 - 22)  SpO2: 92% (15 May 2021 05:39) (90% - 96%)    TESTS & MEASUREMENTS:                        13.9   9.61  )-----------( 231      ( 14 May 2021 18:10 )             42.3     05-14    139  |  103  |  13  ----------------------------<  166<H>  4.1   |  22  |  1.0    Ca    8.8      14 May 2021 18:10    TPro  6.4  /  Alb  3.8  /  TBili  0.3  /  DBili  x   /  AST  24  /  ALT  23  /  AlkPhos  39  05-14    LIVER FUNCTIONS - ( 14 May 2021 18:10 )  Alb: 3.8 g/dL / Pro: 6.4 g/dL / ALK PHOS: 39 U/L / ALT: 23 U/L / AST: 24 U/L / GGT: x                   RADIOLOGY & ADDITIONAL TESTS:  Personal review of radiological diagnostics performed  Echo and EKG results noted when applicable.     MEDICATIONS:  acetaminophen   Tablet .. 650 milliGRAM(s) Oral every 6 hours PRN  ALBUTerol  90 MICROgram(s) HFA Inhaler - Peds 2 Puff(s) Inhalation every 2 hours PRN  albuterol/ipratropium for Nebulization.. 3 milliLiter(s) Nebulizer every 20 minutes  budesonide  80 MICROgram(s)/formoterol 4.5 MICROgram(s) Inhaler 2 Puff(s) Inhalation two times a day  chlorhexidine 4% Liquid 1 Application(s) Topical <User Schedule>  dexAMETHasone  Injectable 6 milliGRAM(s) IV Push daily  enoxaparin Injectable 40 milliGRAM(s) SubCutaneous daily  pantoprazole    Tablet 40 milliGRAM(s) Oral before breakfast  remdesivir  IVPB   IV Intermittent   sodium chloride 0.9%. 1000 milliLiter(s) IV Continuous <Continuous>      ANTIBIOTICS:  remdesivir  IVPB   IV Intermittent

## 2021-05-15 NOTE — CONSULT NOTE ADULT - ASSESSMENT
25 years old male with a pmhx of asthma, on day 9 of Covid symptoms presents today for eval of worsening SOB for past 4 days.     IMPRESSION;  COVID 19 with severe illness. SpO2 < 94% on RA and need for supplemental O2.  Pt is in the late inflammatory response phase ot the illness based on the onset of symptoms.  procalcitonin   Ferritin 508  CRP 80  Ddimers 144  No significant elevation of the inflammatory markers   No bacterial PNA  CXR < 50% opacities    RECOMMENDATIONS;  Target SpO2 92 % to 96 %  Day 1 : Single  mg IV loading dose  Day 2-5 : single  mg IV once daily maintenance dose  Daily CBC,CMP.   Dexamethasone 6 mg iv q24h for 10 days.  Monitor for side effects: hyperglycemia, neurological ( agitation/confusion), adrenal suppression, bacterial and fungal infections

## 2021-05-16 LAB
ALBUMIN SERPL ELPH-MCNC: 3.3 G/DL — LOW (ref 3.5–5.2)
ALP SERPL-CCNC: 40 U/L — SIGNIFICANT CHANGE UP (ref 30–115)
ALT FLD-CCNC: 29 U/L — SIGNIFICANT CHANGE UP (ref 0–41)
ANION GAP SERPL CALC-SCNC: 13 MMOL/L — SIGNIFICANT CHANGE UP (ref 7–14)
AST SERPL-CCNC: 24 U/L — SIGNIFICANT CHANGE UP (ref 0–41)
BASE EXCESS BLDA CALC-SCNC: 1.5 MMOL/L — SIGNIFICANT CHANGE UP (ref -2–2)
BASOPHILS # BLD AUTO: 0.04 K/UL — SIGNIFICANT CHANGE UP (ref 0–0.2)
BASOPHILS NFR BLD AUTO: 0.4 % — SIGNIFICANT CHANGE UP (ref 0–1)
BILIRUB SERPL-MCNC: 0.4 MG/DL — SIGNIFICANT CHANGE UP (ref 0.2–1.2)
BLD GP AB SCN SERPL QL: SIGNIFICANT CHANGE UP
BUN SERPL-MCNC: 18 MG/DL — SIGNIFICANT CHANGE UP (ref 10–20)
CALCIUM SERPL-MCNC: 8.4 MG/DL — LOW (ref 8.5–10.1)
CHLORIDE SERPL-SCNC: 101 MMOL/L — SIGNIFICANT CHANGE UP (ref 98–110)
CO2 SERPL-SCNC: 24 MMOL/L — SIGNIFICANT CHANGE UP (ref 17–32)
CREAT SERPL-MCNC: 0.7 MG/DL — SIGNIFICANT CHANGE UP (ref 0.7–1.5)
EOSINOPHIL # BLD AUTO: 0 K/UL — SIGNIFICANT CHANGE UP (ref 0–0.7)
EOSINOPHIL NFR BLD AUTO: 0 % — SIGNIFICANT CHANGE UP (ref 0–8)
GLUCOSE BLDC GLUCOMTR-MCNC: 130 MG/DL — HIGH (ref 70–99)
GLUCOSE SERPL-MCNC: 138 MG/DL — HIGH (ref 70–99)
HCO3 BLDA-SCNC: 26 MMOL/L — SIGNIFICANT CHANGE UP (ref 23–27)
HCT VFR BLD CALC: 43.8 % — SIGNIFICANT CHANGE UP (ref 42–52)
HGB BLD-MCNC: 14.2 G/DL — SIGNIFICANT CHANGE UP (ref 14–18)
HOROWITZ INDEX BLDA+IHG-RTO: 100 — SIGNIFICANT CHANGE UP
IMM GRANULOCYTES NFR BLD AUTO: 1.8 % — HIGH (ref 0.1–0.3)
LYMPHOCYTES # BLD AUTO: 1.37 K/UL — SIGNIFICANT CHANGE UP (ref 1.2–3.4)
LYMPHOCYTES # BLD AUTO: 13 % — LOW (ref 20.5–51.1)
MAGNESIUM SERPL-MCNC: 1.9 MG/DL — SIGNIFICANT CHANGE UP (ref 1.8–2.4)
MCHC RBC-ENTMCNC: 27.4 PG — SIGNIFICANT CHANGE UP (ref 27–31)
MCHC RBC-ENTMCNC: 32.4 G/DL — SIGNIFICANT CHANGE UP (ref 32–37)
MCV RBC AUTO: 84.6 FL — SIGNIFICANT CHANGE UP (ref 80–94)
MONOCYTES # BLD AUTO: 0.54 K/UL — SIGNIFICANT CHANGE UP (ref 0.1–0.6)
MONOCYTES NFR BLD AUTO: 5.1 % — SIGNIFICANT CHANGE UP (ref 1.7–9.3)
NEUTROPHILS # BLD AUTO: 8.39 K/UL — HIGH (ref 1.4–6.5)
NEUTROPHILS NFR BLD AUTO: 79.7 % — HIGH (ref 42.2–75.2)
NRBC # BLD: 0 /100 WBCS — SIGNIFICANT CHANGE UP (ref 0–0)
PCO2 BLDA: 38 MMHG — SIGNIFICANT CHANGE UP (ref 38–42)
PH BLDA: 7.43 — HIGH (ref 7.38–7.42)
PLATELET # BLD AUTO: 293 K/UL — SIGNIFICANT CHANGE UP (ref 130–400)
PO2 BLDA: 59 MMHG — LOW (ref 78–95)
POTASSIUM SERPL-MCNC: 4.3 MMOL/L — SIGNIFICANT CHANGE UP (ref 3.5–5)
POTASSIUM SERPL-SCNC: 4.3 MMOL/L — SIGNIFICANT CHANGE UP (ref 3.5–5)
PROT SERPL-MCNC: 5.9 G/DL — LOW (ref 6–8)
RBC # BLD: 5.18 M/UL — SIGNIFICANT CHANGE UP (ref 4.7–6.1)
RBC # FLD: 14.1 % — SIGNIFICANT CHANGE UP (ref 11.5–14.5)
SAO2 % BLDA: 94 % — SIGNIFICANT CHANGE UP (ref 94–98)
SODIUM SERPL-SCNC: 138 MMOL/L — SIGNIFICANT CHANGE UP (ref 135–146)
WBC # BLD: 10.53 K/UL — SIGNIFICANT CHANGE UP (ref 4.8–10.8)
WBC # FLD AUTO: 10.53 K/UL — SIGNIFICANT CHANGE UP (ref 4.8–10.8)

## 2021-05-16 PROCEDURE — 99232 SBSQ HOSP IP/OBS MODERATE 35: CPT

## 2021-05-16 PROCEDURE — 93010 ELECTROCARDIOGRAM REPORT: CPT

## 2021-05-16 PROCEDURE — 71045 X-RAY EXAM CHEST 1 VIEW: CPT | Mod: 26

## 2021-05-16 PROCEDURE — 99233 SBSQ HOSP IP/OBS HIGH 50: CPT

## 2021-05-16 RX ORDER — IPRATROPIUM/ALBUTEROL SULFATE 18-103MCG
3 AEROSOL WITH ADAPTER (GRAM) INHALATION ONCE
Refills: 0 | Status: COMPLETED | OUTPATIENT
Start: 2021-05-16 | End: 2021-05-16

## 2021-05-16 RX ORDER — TOCILIZUMAB 20 MG/ML
800 INJECTION, SOLUTION, CONCENTRATE INTRAVENOUS ONCE
Refills: 0 | Status: COMPLETED | OUTPATIENT
Start: 2021-05-16 | End: 2021-05-16

## 2021-05-16 RX ORDER — ENOXAPARIN SODIUM 100 MG/ML
40 INJECTION SUBCUTANEOUS EVERY 12 HOURS
Refills: 0 | Status: DISCONTINUED | OUTPATIENT
Start: 2021-05-16 | End: 2021-05-22

## 2021-05-16 RX ADMIN — ALBUTEROL 2 PUFF(S): 90 AEROSOL, METERED ORAL at 21:23

## 2021-05-16 RX ADMIN — Medication 10 MILLILITER(S): at 05:46

## 2021-05-16 RX ADMIN — ALBUTEROL 2 PUFF(S): 90 AEROSOL, METERED ORAL at 11:00

## 2021-05-16 RX ADMIN — ENOXAPARIN SODIUM 40 MILLIGRAM(S): 100 INJECTION SUBCUTANEOUS at 18:17

## 2021-05-16 RX ADMIN — Medication 10 MILLILITER(S): at 11:02

## 2021-05-16 RX ADMIN — Medication 6 MILLIGRAM(S): at 05:45

## 2021-05-16 RX ADMIN — Medication 650 MILLIGRAM(S): at 03:13

## 2021-05-16 RX ADMIN — BUDESONIDE AND FORMOTEROL FUMARATE DIHYDRATE 2 PUFF(S): 160; 4.5 AEROSOL RESPIRATORY (INHALATION) at 09:40

## 2021-05-16 RX ADMIN — TOCILIZUMAB 100 MILLIGRAM(S): 20 INJECTION, SOLUTION, CONCENTRATE INTRAVENOUS at 13:48

## 2021-05-16 RX ADMIN — BUDESONIDE AND FORMOTEROL FUMARATE DIHYDRATE 2 PUFF(S): 160; 4.5 AEROSOL RESPIRATORY (INHALATION) at 21:22

## 2021-05-16 RX ADMIN — Medication 10 MILLILITER(S): at 18:17

## 2021-05-16 RX ADMIN — Medication 650 MILLIGRAM(S): at 10:42

## 2021-05-16 RX ADMIN — PANTOPRAZOLE SODIUM 40 MILLIGRAM(S): 20 TABLET, DELAYED RELEASE ORAL at 07:53

## 2021-05-16 RX ADMIN — REMDESIVIR 500 MILLIGRAM(S): 5 INJECTION INTRAVENOUS at 05:46

## 2021-05-16 NOTE — PROGRESS NOTE ADULT - ASSESSMENT
IMPRESSION:  Acute hypoxemic respiratory failure  Critical COVID-19 PNA  Doubt superimposed bacterial infection  HO Obesity  HO Asthma      PLAN:    CNS: Avoid CNS depressants.     HEENT: Oral care    PULMONARY:  HOB @ 45 degrees.  Aspiration precautions. Target SpO2 94-96%, wean oxygen as tolerated. Continue decadron. Duonebs PRN.  Incentive Spironmetry    CARDIOVASCULAR: Avoid volume overload.  EKG.  Cardiology eval     GI: GI prophylaxis. Feeding as tolerated.  Bowel regimen     RENAL: Follow up lytes. Correct as needed.     INFECTIOUS DISEASE: TOCI.  Monitor inflammatory markers     HEMATOLOGICAL:  DVT prophylaxis with LMWH.      ENDOCRINE:  Follow up FS.     MUSCULOSKELETAL: OOB to chair    MICU monitoring

## 2021-05-16 NOTE — CONSULT NOTE ADULT - SUBJECTIVE AND OBJECTIVE BOX
HPI:  25 years old male with a pmhx of asthma, on day 9 of Covid symptoms presents today for eval of worsening SOB for past 4 days.   History of presenting illness goes back to 1.5 weeks ago when patient developed fever, highest temp recored 101.2, pt underwent covid testing and was found to be COVID + last week. For past four days has developed progressively worsening SOB, which is imppacting ADL prompting the patient to visit ED. Also reports chills, myalgias and body aches. Pt has been using rescue inhaler as needed with minimal to no relief. Pt denies fever, chills, weakness, numbness, CP, ABD Pain, n/v/d.    In the ED, temp 101.4, , /82, RR 22 saturating well on room air. Before being discharged from ED, [id number] desaturated to 86% placed on 6L NC. Labs WNL, covid +, to be admitted under medicine.  (14 May 2021 21:00)    Cardiology:  Pt. with hx of asthma admitted for severe COVID 19 infection / PNA with cytokine release storm being seen by Cardiology for palpitations.    PAST MEDICAL & SURGICAL HISTORY  Asthma        FAMILY HISTORY:  FAMILY HISTORY:      SOCIAL HISTORY:  [-]smoker  [-]Alcohol  [-]Drug    ALLERGIES:  No Known Allergies      MEDICATIONS:  MEDICATIONS  (STANDING):  albuterol/ipratropium for Nebulization.. 3 milliLiter(s) Nebulizer every 20 minutes  budesonide  80 MICROgram(s)/formoterol 4.5 MICROgram(s) Inhaler 2 Puff(s) Inhalation two times a day  chlorhexidine 4% Liquid 1 Application(s) Topical <User Schedule>  dexAMETHasone  Injectable 6 milliGRAM(s) IV Push daily  enoxaparin Injectable 40 milliGRAM(s) SubCutaneous every 12 hours  guaifenesin/dextromethorphan Oral Liquid 10 milliLiter(s) Oral every 6 hours  pantoprazole    Tablet 40 milliGRAM(s) Oral before breakfast  remdesivir  IVPB   IV Intermittent   remdesivir  IVPB 100 milliGRAM(s) IV Intermittent every 24 hours    MEDICATIONS  (PRN):  acetaminophen   Tablet .. 650 milliGRAM(s) Oral every 6 hours PRN Temp greater or equal to 38C (100.4F), Moderate Pain (4 - 6)  ALBUTerol  90 MICROgram(s) HFA Inhaler - Peds 2 Puff(s) Inhalation every 2 hours PRN Bronchospasm      HOME MEDICATIONS:  Home Medications:  Albuterol (Eqv-ProAir HFA) 90 mcg/inh inhalation aerosol: 2 puff(s) inhaled every 6 hours (14 May 2021 22:23)      VITALS:   T(F): 99.1 (05-16 @ 20:00), Max: 102.2 (05-15 @ 01:11)  HR: 92 (05-16 @ 21:00) (85 - 127)  BP: 120/68 (05-16 @ 20:00) (118/72 - 146/67)  BP(mean): 87 (05-16 @ 20:00) (87 - 90)  RR: 26 (05-16 @ 21:00) (18 - 55)  SpO2: 92% (05-16 @ 21:00) (90% - 99%)    I&O's Summary    15 May 2021 07:01  -  16 May 2021 07:00  --------------------------------------------------------  IN: 0 mL / OUT: 600 mL / NET: -600 mL    16 May 2021 07:01  -  16 May 2021 22:06  --------------------------------------------------------  IN: 840 mL / OUT: 1650 mL / NET: -810 mL        REVIEW OF SYSTEMS:  CONSTITUTIONAL: No weakness, fevers or chills  EYES/ENT: No visual changes;  No vertigo or throat pain   NECK: No pain or stiffness  RESPIRATORY: No cough, wheezing, hemoptysis  CARDIOVASCULAR: No chest pain or palpitations  GASTROINTESTINAL: No abdominal or epigastric pain. No nausea, vomiting, or hematemesis; No diarrhea or constipation. No melena or hematochezia.  GENITOURINARY: No dysuria, frequency or hematuria  NEUROLOGICAL: No numbness or weakness  SKIN: No itching, no rashes    PHYSICAL EXAM:  NEURO: patient is awake , alert and oriented  GEN: Not in acute distress  NECK: no thyroid enlargement, no JVD  LUNGS: Rales auscultation bilaterally   CARDIOVASCULAR: S1/S2 present, RRR , no murmurs or rubs, no carotid bruits,  + PP bilaterally  ABD: Soft, non-tender, non-distended, +BS  EXT: No ROMINA  SKIN: Intact    LABS:                        14.2   10.53 )-----------( 293      ( 16 May 2021 08:57 )             43.8     05-16    138  |  101  |  18  ----------------------------<  138<H>  4.3   |  24  |  0.7    Ca    8.4<L>      16 May 2021 08:57  Mg     1.9     05-16    TPro  5.9<L>  /  Alb  3.3<L>  /  TBili  0.4  /  DBili  x   /  AST  24  /  ALT  29  /  AlkPhos  40  05-16    PT/INR - ( 15 May 2021 08:37 )   PT: 15.80 sec;   INR: 1.37 ratio         PTT - ( 15 May 2021 08:37 )  PTT:28.4 sec    CARDIAC MARKERS ( 15 May 2021 08:37 )  x     / <0.01 ng/mL / x     / x     / x            Troponin trend:      05-15 Chol 148 LDL -- HDL 26<L> Trig 117      RADIOLOGY:  -CXR:  -TTE:  -CCTA:  -STRESS TEST:  -CATHETERIZATION:    ECG:    TELEMETRY EVENTS:   HPI:  25 years old male with a pmhx of asthma, on day 9 of Covid symptoms presents today for eval of worsening SOB for past 4 days.   History of presenting illness goes back to 1.5 weeks ago when patient developed fever, highest temp recored 101.2, pt underwent covid testing and was found to be COVID + last week. For past four days has developed progressively worsening SOB, which is imppacting ADL prompting the patient to visit ED. Also reports chills, myalgias and body aches. Pt has been using rescue inhaler as needed with minimal to no relief. Pt denies fever, chills, weakness, numbness, CP, ABD Pain, n/v/d.    In the ED, temp 101.4, , /82, RR 22 saturating well on room air. Before being discharged from ED, [id number] desaturated to 86% placed on 6L NC. Labs WNL, covid +, to be admitted under medicine.  (14 May 2021 21:00)    Cardiology:  Pt. with hx of asthma admitted for severe COVID 19 infection / PNA with cytokine release storm being seen by Cardiology for palpitations.      PAST MEDICAL & SURGICAL HISTORY  Asthma        FAMILY HISTORY:  FAMILY HISTORY:      SOCIAL HISTORY:  [-]smoker  [-]Alcohol  [-]Drug    ALLERGIES:  No Known Allergies      MEDICATIONS:  MEDICATIONS  (STANDING):  albuterol/ipratropium for Nebulization.. 3 milliLiter(s) Nebulizer every 20 minutes  budesonide  80 MICROgram(s)/formoterol 4.5 MICROgram(s) Inhaler 2 Puff(s) Inhalation two times a day  chlorhexidine 4% Liquid 1 Application(s) Topical <User Schedule>  dexAMETHasone  Injectable 6 milliGRAM(s) IV Push daily  enoxaparin Injectable 40 milliGRAM(s) SubCutaneous every 12 hours  guaifenesin/dextromethorphan Oral Liquid 10 milliLiter(s) Oral every 6 hours  pantoprazole    Tablet 40 milliGRAM(s) Oral before breakfast  remdesivir  IVPB   IV Intermittent   remdesivir  IVPB 100 milliGRAM(s) IV Intermittent every 24 hours    MEDICATIONS  (PRN):  acetaminophen   Tablet .. 650 milliGRAM(s) Oral every 6 hours PRN Temp greater or equal to 38C (100.4F), Moderate Pain (4 - 6)  ALBUTerol  90 MICROgram(s) HFA Inhaler - Peds 2 Puff(s) Inhalation every 2 hours PRN Bronchospasm      HOME MEDICATIONS:  Home Medications:  Albuterol (Eqv-ProAir HFA) 90 mcg/inh inhalation aerosol: 2 puff(s) inhaled every 6 hours (14 May 2021 22:23)      VITALS:   T(F): 99.1 (05-16 @ 20:00), Max: 102.2 (05-15 @ 01:11)  HR: 92 (05-16 @ 21:00) (85 - 127)  BP: 120/68 (05-16 @ 20:00) (118/72 - 146/67)  BP(mean): 87 (05-16 @ 20:00) (87 - 90)  RR: 26 (05-16 @ 21:00) (18 - 55)  SpO2: 92% (05-16 @ 21:00) (90% - 99%)    I&O's Summary    15 May 2021 07:01  -  16 May 2021 07:00  --------------------------------------------------------  IN: 0 mL / OUT: 600 mL / NET: -600 mL    16 May 2021 07:01  -  16 May 2021 22:06  --------------------------------------------------------  IN: 840 mL / OUT: 1650 mL / NET: -810 mL        REVIEW OF SYSTEMS:  CONSTITUTIONAL: No weakness, fevers or chills  EYES/ENT: No visual changes;  No vertigo or throat pain   NECK: No pain or stiffness  RESPIRATORY: No cough, wheezing, hemoptysis  CARDIOVASCULAR: No chest pain or palpitations  GASTROINTESTINAL: No abdominal or epigastric pain. No nausea, vomiting, or hematemesis; No diarrhea or constipation. No melena or hematochezia.  GENITOURINARY: No dysuria, frequency or hematuria  NEUROLOGICAL: No numbness or weakness  SKIN: No itching, no rashes    PHYSICAL EXAM:  NEURO: patient is awake , alert and oriented  GEN: Not in acute distress  NECK: no thyroid enlargement, no JVD  LUNGS: Rales auscultation bilaterally   CARDIOVASCULAR: S1/S2 present, RRR , no murmurs or rubs, no carotid bruits,  + PP bilaterally  ABD: Soft, non-tender, non-distended, +BS  EXT: No ROMINA  SKIN: Intact    LABS:                        14.2   10.53 )-----------( 293      ( 16 May 2021 08:57 )             43.8     05-16    138  |  101  |  18  ----------------------------<  138<H>  4.3   |  24  |  0.7    Ca    8.4<L>      16 May 2021 08:57  Mg     1.9     05-16    TPro  5.9<L>  /  Alb  3.3<L>  /  TBili  0.4  /  DBili  x   /  AST  24  /  ALT  29  /  AlkPhos  40  05-16    PT/INR - ( 15 May 2021 08:37 )   PT: 15.80 sec;   INR: 1.37 ratio         PTT - ( 15 May 2021 08:37 )  PTT:28.4 sec    CARDIAC MARKERS ( 15 May 2021 08:37 )  x     / <0.01 ng/mL / x     / x     / x            Troponin trend:      05-15 Chol 148 LDL -- HDL 26<L> Trig 117      RADIOLOGY:  -CXR:  -TTE:  -CCTA:  -STRESS TEST:  -CATHETERIZATION:    ECG:    TELEMETRY EVENTS:

## 2021-05-16 NOTE — CONSULT NOTE ADULT - ATTENDING COMMENTS
25 years old male with a pmhx of asthma, with severe COVID 19 PNA with cytokine release storm being seen by Cardiology for palpitations, found to have episodes of sinus tachycardia.    Impression:    Severe COVID 19 PNA / Cytokine release storm on 100% NC s/p RDV, Tocilizumab, and on IV Decadron   Palpitations - from sinus tachycardia, no longer tachycardic    Recommend:    - EKG and tele events noted  - Pt. was previously in sinus tachycardia due to underlying severe infection / sepsis  - Currently in NSR, no longer having palpitations  - Monitor electrolytes, keep K >4, and Mg >2  - DVT/PE prophylaxis  - If persistent tachycardia, consider evaluation for PE  - No further cardiac workup needed.  - treatment of COVID-19 PNA as per primary team/pulmonary/CCM/ID

## 2021-05-16 NOTE — PROGRESS NOTE ADULT - ASSESSMENT
· Assessment	  25 years old male with a pmhx of asthma, on day 9 of Covid symptoms presents today for eval of worsening SOB for past 4 days.     IMPRESSION;  COVID 19 with progression of severe illness presently on NRB and CXR with worsening opacities b/l  Pt is in the late inflammatory response phase ot the illness based on the onset of symptoms.  Clinically has a cytokine storm  procalcitonin 0.12   Ferritin 508  CRP 80  Ddimers 144  No bacterial PNA    RECOMMENDATIONS;  Target SpO2 92 % to 96 %  Tocilizumab iv 800  mg once. Ferritin 48h later   Day 1 : Single  mg IV loading dose  Day 2-5 : single  mg IV once daily maintenance dose  Daily CBC,CMP.   Dexamethasone 6 mg iv q24h for 10 days.  Monitor for side effects: hyperglycemia, neurological ( agitation/confusion), adrenal suppression, bacterial and fungal infections

## 2021-05-16 NOTE — PROGRESS NOTE ADULT - ASSESSMENT
24 yo M with hx of asthma currently admitted and being txed for severe covid-19 PNA with acute hypoxic respiratory failure     #Acute Hypoxic Respiratory Failure  #COVID19 PNA  #h/o Asthma  Currently on NRB, saturating 94-95%  - Cont decadron 6mg qD (Start 05/14)  - Cont RDV Therapy (End 05/19)  - IV toci x1 dose today  - Titrate down oxygen requirements as tolerated  - Cont symbicort  - Transfer to MICU    DVT PPX, Lovenox 40 BID    #Progress Note Handoff  Pending (specify): Wean off O2 as tolerated, cont decadron/RDV  Family discussion: d/w pt regarding transfer to MICU  Disposition: From home

## 2021-05-16 NOTE — CONSULT NOTE ADULT - ASSESSMENT
25 years old male with a pmhx of asthma, on day 9 of Covid symptoms presents today for eval of worsening SOB. Pt. admitted for severe COVID 19 infection / PNA with cytokine release storm being seen by Cardiology for palpitations.    Impression:    Severe COVID 19 PNA / Cytokine release storm on HiFlo NC s/p RDV, Tocilizumab, and on IV Decadron   Palpitations - from sinus tachycardia, no longer tachycardic    Recommend:    - EKG and tele events noted  - Pt. was previously in sinus tachycardia due to underlying severe infection / sepsis  - Currently in NSR, no longer having palpitations  - Monitor electrolytes, keep K >4, and Mg >2  - No further cardiac workup

## 2021-05-17 LAB
ALBUMIN SERPL ELPH-MCNC: 3.4 G/DL — LOW (ref 3.5–5.2)
ALP SERPL-CCNC: 41 U/L — SIGNIFICANT CHANGE UP (ref 30–115)
ALT FLD-CCNC: 28 U/L — SIGNIFICANT CHANGE UP (ref 0–41)
ANION GAP SERPL CALC-SCNC: 17 MMOL/L — HIGH (ref 7–14)
ANISOCYTOSIS BLD QL: SIGNIFICANT CHANGE UP
AST SERPL-CCNC: 22 U/L — SIGNIFICANT CHANGE UP (ref 0–41)
BASOPHILS # BLD AUTO: 0 K/UL — SIGNIFICANT CHANGE UP (ref 0–0.2)
BASOPHILS NFR BLD AUTO: 0 % — SIGNIFICANT CHANGE UP (ref 0–1)
BILIRUB SERPL-MCNC: 0.4 MG/DL — SIGNIFICANT CHANGE UP (ref 0.2–1.2)
BUN SERPL-MCNC: 18 MG/DL — SIGNIFICANT CHANGE UP (ref 10–20)
CALCIUM SERPL-MCNC: 8 MG/DL — LOW (ref 8.5–10.1)
CHLORIDE SERPL-SCNC: 101 MMOL/L — SIGNIFICANT CHANGE UP (ref 98–110)
CO2 SERPL-SCNC: 21 MMOL/L — SIGNIFICANT CHANGE UP (ref 17–32)
CREAT SERPL-MCNC: 0.9 MG/DL — SIGNIFICANT CHANGE UP (ref 0.7–1.5)
CRP SERPL-MCNC: 84 MG/L — HIGH
EOSINOPHIL # BLD AUTO: 0 K/UL — SIGNIFICANT CHANGE UP (ref 0–0.7)
EOSINOPHIL NFR BLD AUTO: 0 % — SIGNIFICANT CHANGE UP (ref 0–8)
FERRITIN SERPL-MCNC: 656 NG/ML — HIGH (ref 30–400)
GIANT PLATELETS BLD QL SMEAR: PRESENT — SIGNIFICANT CHANGE UP
GLUCOSE SERPL-MCNC: 127 MG/DL — HIGH (ref 70–99)
HCT VFR BLD CALC: 43.3 % — SIGNIFICANT CHANGE UP (ref 42–52)
HGB BLD-MCNC: 13.9 G/DL — LOW (ref 14–18)
LYMPHOCYTES # BLD AUTO: 1.45 K/UL — SIGNIFICANT CHANGE UP (ref 1.2–3.4)
LYMPHOCYTES # BLD AUTO: 24.4 % — SIGNIFICANT CHANGE UP (ref 20.5–51.1)
MAGNESIUM SERPL-MCNC: 2.2 MG/DL — SIGNIFICANT CHANGE UP (ref 1.8–2.4)
MANUAL SMEAR VERIFICATION: SIGNIFICANT CHANGE UP
MCHC RBC-ENTMCNC: 27.6 PG — SIGNIFICANT CHANGE UP (ref 27–31)
MCHC RBC-ENTMCNC: 32.1 G/DL — SIGNIFICANT CHANGE UP (ref 32–37)
MCV RBC AUTO: 85.9 FL — SIGNIFICANT CHANGE UP (ref 80–94)
MICROCYTES BLD QL: SIGNIFICANT CHANGE UP
MONOCYTES # BLD AUTO: 0.57 K/UL — SIGNIFICANT CHANGE UP (ref 0.1–0.6)
MONOCYTES NFR BLD AUTO: 9.6 % — HIGH (ref 1.7–9.3)
MYELOCYTES NFR BLD: 1.7 % — HIGH (ref 0–0)
NEUTROPHILS # BLD AUTO: 3.66 K/UL — SIGNIFICANT CHANGE UP (ref 1.4–6.5)
NEUTROPHILS NFR BLD AUTO: 61.7 % — SIGNIFICANT CHANGE UP (ref 42.2–75.2)
PLAT MORPH BLD: NORMAL — SIGNIFICANT CHANGE UP
PLATELET # BLD AUTO: 305 K/UL — SIGNIFICANT CHANGE UP (ref 130–400)
POLYCHROMASIA BLD QL SMEAR: SIGNIFICANT CHANGE UP
POTASSIUM SERPL-MCNC: 4.2 MMOL/L — SIGNIFICANT CHANGE UP (ref 3.5–5)
POTASSIUM SERPL-SCNC: 4.2 MMOL/L — SIGNIFICANT CHANGE UP (ref 3.5–5)
PROCALCITONIN SERPL-MCNC: 0.11 NG/ML — HIGH (ref 0.02–0.1)
PROMYELOCYTES # FLD: 0.9 % — HIGH (ref 0–0)
PROT SERPL-MCNC: 5.9 G/DL — LOW (ref 6–8)
RBC # BLD: 5.04 M/UL — SIGNIFICANT CHANGE UP (ref 4.7–6.1)
RBC # FLD: 14.2 % — SIGNIFICANT CHANGE UP (ref 11.5–14.5)
RBC BLD AUTO: NORMAL — SIGNIFICANT CHANGE UP
SODIUM SERPL-SCNC: 139 MMOL/L — SIGNIFICANT CHANGE UP (ref 135–146)
VARIANT LYMPHS # BLD: 1.7 % — SIGNIFICANT CHANGE UP (ref 0–5)
WBC # BLD: 5.93 K/UL — SIGNIFICANT CHANGE UP (ref 4.8–10.8)
WBC # FLD AUTO: 5.93 K/UL — SIGNIFICANT CHANGE UP (ref 4.8–10.8)

## 2021-05-17 PROCEDURE — 99222 1ST HOSP IP/OBS MODERATE 55: CPT

## 2021-05-17 PROCEDURE — 71045 X-RAY EXAM CHEST 1 VIEW: CPT | Mod: 26

## 2021-05-17 PROCEDURE — 99232 SBSQ HOSP IP/OBS MODERATE 35: CPT

## 2021-05-17 RX ADMIN — Medication 10 MILLILITER(S): at 23:13

## 2021-05-17 RX ADMIN — BUDESONIDE AND FORMOTEROL FUMARATE DIHYDRATE 2 PUFF(S): 160; 4.5 AEROSOL RESPIRATORY (INHALATION) at 08:10

## 2021-05-17 RX ADMIN — Medication 10 MILLILITER(S): at 12:03

## 2021-05-17 RX ADMIN — ENOXAPARIN SODIUM 40 MILLIGRAM(S): 100 INJECTION SUBCUTANEOUS at 06:26

## 2021-05-17 RX ADMIN — Medication 6 MILLIGRAM(S): at 06:24

## 2021-05-17 RX ADMIN — BUDESONIDE AND FORMOTEROL FUMARATE DIHYDRATE 2 PUFF(S): 160; 4.5 AEROSOL RESPIRATORY (INHALATION) at 21:08

## 2021-05-17 RX ADMIN — CHLORHEXIDINE GLUCONATE 1 APPLICATION(S): 213 SOLUTION TOPICAL at 06:24

## 2021-05-17 RX ADMIN — ALBUTEROL 2 PUFF(S): 90 AEROSOL, METERED ORAL at 23:43

## 2021-05-17 RX ADMIN — Medication 10 MILLILITER(S): at 18:34

## 2021-05-17 RX ADMIN — ENOXAPARIN SODIUM 40 MILLIGRAM(S): 100 INJECTION SUBCUTANEOUS at 18:34

## 2021-05-17 RX ADMIN — Medication 10 MILLILITER(S): at 01:36

## 2021-05-17 RX ADMIN — REMDESIVIR 500 MILLIGRAM(S): 5 INJECTION INTRAVENOUS at 06:00

## 2021-05-17 RX ADMIN — Medication 60 MILLIGRAM(S): at 11:14

## 2021-05-17 RX ADMIN — Medication 10 MILLILITER(S): at 06:26

## 2021-05-17 RX ADMIN — Medication 60 MILLIGRAM(S): at 21:08

## 2021-05-17 RX ADMIN — PANTOPRAZOLE SODIUM 40 MILLIGRAM(S): 20 TABLET, DELAYED RELEASE ORAL at 06:26

## 2021-05-17 NOTE — PROGRESS NOTE ADULT - ASSESSMENT
IMPRESSION:    Acute hypoxemic respiratory failure  Critical COVID-19 PNA  Doubt superimposed bacterial infection  HO Obesity  HO Asthma      PLAN:    CNS: Avoid CNS depressants.     HEENT: Oral care    PULMONARY:  HOB @ 45 degrees.  Aspiration precautions. Target SpO2 94-96%, wean oxygen as tolerated. solumedrop. Duonebs PRN.  Incentive Spirometry    CARDIOVASCULAR: Avoid volume overload    GI: GI prophylaxis. Feeding as tolerated.  Bowel regimen     RENAL: Follow up lytes. Correct as needed.     INFECTIOUS DISEASE: TOCI.  Monitor inflammatory markers     HEMATOLOGICAL:  DVT prophylaxis with LMWH.  LE doppler    ENDOCRINE:  Follow up FS.     MUSCULOSKELETAL: OOB to chair    MICU monitoring

## 2021-05-17 NOTE — PROGRESS NOTE ADULT - ASSESSMENT
25 years old male with a pmhx of asthma, on day 9 of Covid symptoms presents today for eval of worsening SOB for past 4 days. Patient was placed on 6L NC in ED, overnight patient desaturated and was switched to 10L NRB. This morning saturating 94% on NRB. was placed on RDV and dexamethasone. Patient with worsening CXR and continues to be on NRB saturating low 90s, decision made to give TOCI 800mg x1 (5/16) and transfer to ICU per Dr Li for close monitoring.     Impression:  Severe Covid PNA  Acute Asthma exacerbation    # SOB / moderate - severe COVID - 19 PNA  # Acute asthma exacerbation  - CXR shows b/l pulmonary opacities worsening  - repeat D-Dimer 222, ferritin 508, CRP 80, Procal 0.13 noted.  - now on NRB saturating 94%  - monitor O2, titrate O2 as needed to goal of 92 - 96%  - d/c decadron start solumedrol 60 mg iv q 12h  - remdesivir 200mg IV x 1 day followed by 100mg IV x 4 days  - anticoagulation: lovenox 40 bid   - tylenol for fever  - seen by ID, 800mg toci x1 5/16/21. COVID antibody +  - c/w inhalers  - trend D-Dimer, CRP, ferritin q48hrs    # Diet: Regular  # DVT Prophylaxis: Lovenox BID 40mg   # GI Prophylaxis: Protonix  # Activity: IAT  # Dispo: Acute  # Code Status: Fullcode

## 2021-05-18 LAB
ALBUMIN SERPL ELPH-MCNC: 3.6 G/DL — SIGNIFICANT CHANGE UP (ref 3.5–5.2)
ALP SERPL-CCNC: 49 U/L — SIGNIFICANT CHANGE UP (ref 30–115)
ALT FLD-CCNC: 58 U/L — HIGH (ref 0–41)
ANION GAP SERPL CALC-SCNC: 14 MMOL/L — SIGNIFICANT CHANGE UP (ref 7–14)
AST SERPL-CCNC: 26 U/L — SIGNIFICANT CHANGE UP (ref 0–41)
BASOPHILS # BLD AUTO: 0.07 K/UL — SIGNIFICANT CHANGE UP (ref 0–0.2)
BASOPHILS NFR BLD AUTO: 0.6 % — SIGNIFICANT CHANGE UP (ref 0–1)
BILIRUB SERPL-MCNC: 0.3 MG/DL — SIGNIFICANT CHANGE UP (ref 0.2–1.2)
BUN SERPL-MCNC: 22 MG/DL — HIGH (ref 10–20)
CALCIUM SERPL-MCNC: 8.9 MG/DL — SIGNIFICANT CHANGE UP (ref 8.5–10.1)
CHLORIDE SERPL-SCNC: 101 MMOL/L — SIGNIFICANT CHANGE UP (ref 98–110)
CO2 SERPL-SCNC: 23 MMOL/L — SIGNIFICANT CHANGE UP (ref 17–32)
CREAT SERPL-MCNC: 0.7 MG/DL — SIGNIFICANT CHANGE UP (ref 0.7–1.5)
CRP SERPL-MCNC: 47 MG/L — HIGH
D DIMER BLD IA.RAPID-MCNC: 170 NG/ML DDU — SIGNIFICANT CHANGE UP (ref 0–230)
EOSINOPHIL # BLD AUTO: 0 K/UL — SIGNIFICANT CHANGE UP (ref 0–0.7)
EOSINOPHIL NFR BLD AUTO: 0 % — SIGNIFICANT CHANGE UP (ref 0–8)
GLUCOSE SERPL-MCNC: 166 MG/DL — HIGH (ref 70–99)
HCT VFR BLD CALC: 47.1 % — SIGNIFICANT CHANGE UP (ref 42–52)
HGB BLD-MCNC: 15.6 G/DL — SIGNIFICANT CHANGE UP (ref 14–18)
IMM GRANULOCYTES NFR BLD AUTO: 4.1 % — HIGH (ref 0.1–0.3)
LYMPHOCYTES # BLD AUTO: 1.34 K/UL — SIGNIFICANT CHANGE UP (ref 1.2–3.4)
LYMPHOCYTES # BLD AUTO: 11.4 % — LOW (ref 20.5–51.1)
MCHC RBC-ENTMCNC: 27.5 PG — SIGNIFICANT CHANGE UP (ref 27–31)
MCHC RBC-ENTMCNC: 33.1 G/DL — SIGNIFICANT CHANGE UP (ref 32–37)
MCV RBC AUTO: 83.1 FL — SIGNIFICANT CHANGE UP (ref 80–94)
MONOCYTES # BLD AUTO: 0.43 K/UL — SIGNIFICANT CHANGE UP (ref 0.1–0.6)
MONOCYTES NFR BLD AUTO: 3.7 % — SIGNIFICANT CHANGE UP (ref 1.7–9.3)
NEUTROPHILS # BLD AUTO: 9.43 K/UL — HIGH (ref 1.4–6.5)
NEUTROPHILS NFR BLD AUTO: 80.2 % — HIGH (ref 42.2–75.2)
NRBC # BLD: 0 /100 WBCS — SIGNIFICANT CHANGE UP (ref 0–0)
PLATELET # BLD AUTO: 352 K/UL — SIGNIFICANT CHANGE UP (ref 130–400)
POTASSIUM SERPL-MCNC: 4.7 MMOL/L — SIGNIFICANT CHANGE UP (ref 3.5–5)
POTASSIUM SERPL-SCNC: 4.7 MMOL/L — SIGNIFICANT CHANGE UP (ref 3.5–5)
PROCALCITONIN SERPL-MCNC: 0.04 NG/ML — SIGNIFICANT CHANGE UP (ref 0.02–0.1)
PROT SERPL-MCNC: 6.2 G/DL — SIGNIFICANT CHANGE UP (ref 6–8)
RBC # BLD: 5.67 M/UL — SIGNIFICANT CHANGE UP (ref 4.7–6.1)
RBC # FLD: 13.4 % — SIGNIFICANT CHANGE UP (ref 11.5–14.5)
SODIUM SERPL-SCNC: 138 MMOL/L — SIGNIFICANT CHANGE UP (ref 135–146)
WBC # BLD: 11.75 K/UL — HIGH (ref 4.8–10.8)
WBC # FLD AUTO: 11.75 K/UL — HIGH (ref 4.8–10.8)

## 2021-05-18 PROCEDURE — 71045 X-RAY EXAM CHEST 1 VIEW: CPT | Mod: 26

## 2021-05-18 PROCEDURE — 99232 SBSQ HOSP IP/OBS MODERATE 35: CPT

## 2021-05-18 PROCEDURE — 93970 EXTREMITY STUDY: CPT | Mod: 26

## 2021-05-18 RX ORDER — FUROSEMIDE 40 MG
40 TABLET ORAL ONCE
Refills: 0 | Status: COMPLETED | OUTPATIENT
Start: 2021-05-18 | End: 2021-05-18

## 2021-05-18 RX ADMIN — PANTOPRAZOLE SODIUM 40 MILLIGRAM(S): 20 TABLET, DELAYED RELEASE ORAL at 06:03

## 2021-05-18 RX ADMIN — ENOXAPARIN SODIUM 40 MILLIGRAM(S): 100 INJECTION SUBCUTANEOUS at 17:00

## 2021-05-18 RX ADMIN — Medication 10 MILLILITER(S): at 23:21

## 2021-05-18 RX ADMIN — Medication 60 MILLIGRAM(S): at 22:21

## 2021-05-18 RX ADMIN — Medication 10 MILLILITER(S): at 12:29

## 2021-05-18 RX ADMIN — Medication 40 MILLIGRAM(S): at 10:00

## 2021-05-18 RX ADMIN — BUDESONIDE AND FORMOTEROL FUMARATE DIHYDRATE 2 PUFF(S): 160; 4.5 AEROSOL RESPIRATORY (INHALATION) at 21:15

## 2021-05-18 RX ADMIN — BUDESONIDE AND FORMOTEROL FUMARATE DIHYDRATE 2 PUFF(S): 160; 4.5 AEROSOL RESPIRATORY (INHALATION) at 21:16

## 2021-05-18 RX ADMIN — Medication 10 MILLILITER(S): at 06:04

## 2021-05-18 RX ADMIN — Medication 60 MILLIGRAM(S): at 13:38

## 2021-05-18 RX ADMIN — Medication 10 MILLILITER(S): at 17:00

## 2021-05-18 RX ADMIN — Medication 60 MILLIGRAM(S): at 06:03

## 2021-05-18 RX ADMIN — BUDESONIDE AND FORMOTEROL FUMARATE DIHYDRATE 2 PUFF(S): 160; 4.5 AEROSOL RESPIRATORY (INHALATION) at 09:40

## 2021-05-18 RX ADMIN — REMDESIVIR 500 MILLIGRAM(S): 5 INJECTION INTRAVENOUS at 06:03

## 2021-05-18 RX ADMIN — ENOXAPARIN SODIUM 40 MILLIGRAM(S): 100 INJECTION SUBCUTANEOUS at 06:03

## 2021-05-18 NOTE — PROGRESS NOTE ADULT - ASSESSMENT
25 years old male with a pmhx of asthma, on day 9 of Covid symptoms presents today for eval of worsening SOB for past 4 days. Patient was placed on 6L NC in ED, overnight patient desaturated and was switched to 10L NRB. This morning saturating 94% on NRB. was placed on RDV and dexamethasone. Patient with worsening CXR and continues to be on NRB saturating low 90s, decision made to give TOCI 800mg x1 (5/16) and transfer to ICU per Dr Li for close monitoring.     Impression:  Severe Covid PNA  Acute Asthma exacerbation    # SOB / moderate - severe COVID - 19 PNA  # Acute asthma exacerbation  - CXR shows b/l pulmonary opacities worsening  - repeat D-Dimer 222-170, ferritin 508-656, CRP 80, Procal 0.13-0.12noted.  - now on NRB and HFNC 40L 100% saturating 94%  - monitor O2, titrate O2 as needed to goal of 92 - 96%  - c/w  solumedrol 60 mg iv q 12h  - remdesivir 200mg IV x 1 day followed by 100mg IV x 4 days  - anticoagulation: lovenox 40 bid   - tylenol for fever  - seen by ID, 800mg toci x1 5/16/21. COVID antibody +  - c/w inhalers  - trend D-Dimer, CRP, ferritin q48hrs  -Duplex venous  -LASIX 40 MG iv ONCE    # Diet: Regular  # DVT Prophylaxis: Lovenox BID 40mg   # GI Prophylaxis: Protonix  # Activity: IAT  # Dispo: Acute  # Code Status: Fullcode

## 2021-05-18 NOTE — PROGRESS NOTE ADULT - ASSESSMENT
25 years old male with a pmhx of asthma, on day 9 of Covid symptoms presents today for eval of worsening SOB for past 4 days.     IMPRESSION;  COVID 19 with progression of severe illness presently on NRB +HFNC and CXR with worsening opacities b/l  Pt is in the late inflammatory response phase ot the illness based on the onset of symptoms.  Clinically has a cytokine storm  Seems to be responding to Toci  procalcitonin 0.11  Ferritin 508>656  CRP 80>84  Ddimers 144>170  No bacterial PNA    s/p Toci 5/16    RECOMMENDATIONS;  Target SpO2 92 % to 96 %  Day 1 : Single  mg IV loading dose  Day 2-5 : single  mg IV once daily maintenance dose  Daily CBC,CMP.   Dexamethasone 6 mg iv q24h for 10 days.  Monitor for side effects: hyperglycemia, neurological ( agitation/confusion), adrenal suppression, bacterial and fungal infections

## 2021-05-18 NOTE — PROGRESS NOTE ADULT - ASSESSMENT
IMPRESSION:    Acute hypoxemic respiratory failure  Critical COVID-19 PNA  asthma exacerbation  Doubt superimposed bacterial infection  HO Obesity      PLAN:    CNS: Avoid CNS depressants.     HEENT: Oral care    PULMONARY:  HOB @ 45 degrees.  Aspiration precautions. Target SpO2 94-96%, wean oxygen as tolerated. solumedrol. Duonebs PRN.  Incentive Spirometry    CARDIOVASCULAR: Avoid volume overload, lasix once    GI: GI prophylaxis. Feeding as tolerated.  Bowel regimen     RENAL: Follow up lytes. Correct as needed.     INFECTIOUS DISEASE: TOCI.  Monitor inflammatory markers     HEMATOLOGICAL:  DVT prophylaxis with LMWH.  LE doppler    ENDOCRINE:  Follow up FS.     MUSCULOSKELETAL: OOB to chair    MICU monitoring

## 2021-05-19 LAB
ALBUMIN SERPL ELPH-MCNC: 3.7 G/DL — SIGNIFICANT CHANGE UP (ref 3.5–5.2)
ALP SERPL-CCNC: 49 U/L — SIGNIFICANT CHANGE UP (ref 30–115)
ALT FLD-CCNC: 64 U/L — HIGH (ref 0–41)
ANION GAP SERPL CALC-SCNC: 12 MMOL/L — SIGNIFICANT CHANGE UP (ref 7–14)
AST SERPL-CCNC: 25 U/L — SIGNIFICANT CHANGE UP (ref 0–41)
BASOPHILS # BLD AUTO: 0.05 K/UL — SIGNIFICANT CHANGE UP (ref 0–0.2)
BASOPHILS NFR BLD AUTO: 0.3 % — SIGNIFICANT CHANGE UP (ref 0–1)
BILIRUB SERPL-MCNC: 0.4 MG/DL — SIGNIFICANT CHANGE UP (ref 0.2–1.2)
BUN SERPL-MCNC: 30 MG/DL — HIGH (ref 10–20)
CALCIUM SERPL-MCNC: 9 MG/DL — SIGNIFICANT CHANGE UP (ref 8.5–10.1)
CHLORIDE SERPL-SCNC: 103 MMOL/L — SIGNIFICANT CHANGE UP (ref 98–110)
CO2 SERPL-SCNC: 23 MMOL/L — SIGNIFICANT CHANGE UP (ref 17–32)
CREAT SERPL-MCNC: 1 MG/DL — SIGNIFICANT CHANGE UP (ref 0.7–1.5)
CRP SERPL-MCNC: 22 MG/L — HIGH
D DIMER BLD IA.RAPID-MCNC: 93 NG/ML DDU — SIGNIFICANT CHANGE UP (ref 0–230)
EOSINOPHIL # BLD AUTO: 0 K/UL — SIGNIFICANT CHANGE UP (ref 0–0.7)
EOSINOPHIL NFR BLD AUTO: 0 % — SIGNIFICANT CHANGE UP (ref 0–8)
GLUCOSE SERPL-MCNC: 189 MG/DL — HIGH (ref 70–99)
HCT VFR BLD CALC: 47.4 % — SIGNIFICANT CHANGE UP (ref 42–52)
HGB BLD-MCNC: 15.7 G/DL — SIGNIFICANT CHANGE UP (ref 14–18)
IMM GRANULOCYTES NFR BLD AUTO: 3.3 % — HIGH (ref 0.1–0.3)
LYMPHOCYTES # BLD AUTO: 1.42 K/UL — SIGNIFICANT CHANGE UP (ref 1.2–3.4)
LYMPHOCYTES # BLD AUTO: 8.4 % — LOW (ref 20.5–51.1)
MAGNESIUM SERPL-MCNC: 2.5 MG/DL — HIGH (ref 1.8–2.4)
MCHC RBC-ENTMCNC: 28 PG — SIGNIFICANT CHANGE UP (ref 27–31)
MCHC RBC-ENTMCNC: 33.1 G/DL — SIGNIFICANT CHANGE UP (ref 32–37)
MCV RBC AUTO: 84.5 FL — SIGNIFICANT CHANGE UP (ref 80–94)
MONOCYTES # BLD AUTO: 0.71 K/UL — HIGH (ref 0.1–0.6)
MONOCYTES NFR BLD AUTO: 4.2 % — SIGNIFICANT CHANGE UP (ref 1.7–9.3)
NEUTROPHILS # BLD AUTO: 14.16 K/UL — HIGH (ref 1.4–6.5)
NEUTROPHILS NFR BLD AUTO: 83.8 % — HIGH (ref 42.2–75.2)
NRBC # BLD: 0 /100 WBCS — SIGNIFICANT CHANGE UP (ref 0–0)
PHOSPHATE SERPL-MCNC: 5.3 MG/DL — HIGH (ref 2.1–4.9)
PLATELET # BLD AUTO: 436 K/UL — HIGH (ref 130–400)
POTASSIUM SERPL-MCNC: 4.8 MMOL/L — SIGNIFICANT CHANGE UP (ref 3.5–5)
POTASSIUM SERPL-SCNC: 4.8 MMOL/L — SIGNIFICANT CHANGE UP (ref 3.5–5)
PROT SERPL-MCNC: 6.3 G/DL — SIGNIFICANT CHANGE UP (ref 6–8)
RBC # BLD: 5.61 M/UL — SIGNIFICANT CHANGE UP (ref 4.7–6.1)
RBC # FLD: 13.3 % — SIGNIFICANT CHANGE UP (ref 11.5–14.5)
SODIUM SERPL-SCNC: 138 MMOL/L — SIGNIFICANT CHANGE UP (ref 135–146)
WBC # BLD: 16.9 K/UL — HIGH (ref 4.8–10.8)
WBC # FLD AUTO: 16.9 K/UL — HIGH (ref 4.8–10.8)

## 2021-05-19 PROCEDURE — 99232 SBSQ HOSP IP/OBS MODERATE 35: CPT

## 2021-05-19 PROCEDURE — 71045 X-RAY EXAM CHEST 1 VIEW: CPT | Mod: 26

## 2021-05-19 RX ADMIN — REMDESIVIR 500 MILLIGRAM(S): 5 INJECTION INTRAVENOUS at 05:55

## 2021-05-19 RX ADMIN — Medication 10 MILLILITER(S): at 12:38

## 2021-05-19 RX ADMIN — BUDESONIDE AND FORMOTEROL FUMARATE DIHYDRATE 2 PUFF(S): 160; 4.5 AEROSOL RESPIRATORY (INHALATION) at 09:46

## 2021-05-19 RX ADMIN — ENOXAPARIN SODIUM 40 MILLIGRAM(S): 100 INJECTION SUBCUTANEOUS at 05:56

## 2021-05-19 RX ADMIN — Medication 60 MILLIGRAM(S): at 17:00

## 2021-05-19 RX ADMIN — CHLORHEXIDINE GLUCONATE 1 APPLICATION(S): 213 SOLUTION TOPICAL at 05:56

## 2021-05-19 RX ADMIN — ENOXAPARIN SODIUM 40 MILLIGRAM(S): 100 INJECTION SUBCUTANEOUS at 17:00

## 2021-05-19 RX ADMIN — Medication 10 MILLILITER(S): at 05:55

## 2021-05-19 RX ADMIN — Medication 10 MILLILITER(S): at 17:00

## 2021-05-19 RX ADMIN — Medication 60 MILLIGRAM(S): at 05:55

## 2021-05-19 RX ADMIN — PANTOPRAZOLE SODIUM 40 MILLIGRAM(S): 20 TABLET, DELAYED RELEASE ORAL at 05:55

## 2021-05-19 NOTE — PROGRESS NOTE ADULT - ASSESSMENT
IMPRESSION:    Acute hypoxemic respiratory failure on 100%  Critical COVID-19 PNA  asthma exacerbation  Doubt superimposed bacterial infection        PLAN:    CNS: Avoid CNS depressants.     HEENT: Oral care    PULMONARY:  HOB @ 45 degrees.  Aspiration precautions. Target SpO2 94-96%, wean oxygen as tolerated. solumedrol q 12h. Duonebs PRN.  Incentive Spirometry    CARDIOVASCULAR: Avoid volume overload,    GI: GI prophylaxis. Feeding as tolerated.  Bowel regimen     RENAL: Follow up lytes. Correct as needed.     INFECTIOUS DISEASE: TOCI.  Monitor inflammatory markers     HEMATOLOGICAL:  DVT prophylaxis with LMWH.     ENDOCRINE:  Follow up FS.     MUSCULOSKELETAL: OOB to chair    MICU monitoring

## 2021-05-19 NOTE — PROGRESS NOTE ADULT - ASSESSMENT
· Assessment	  25 years old male with a pmhx of asthma, on day 9 of Covid symptoms presents today for eval of worsening SOB for past 4 days.     IMPRESSION;  COVID 19 with progression of severe illness presently on NRB +HFNC and CXR with worsening opacities b/l  Pt is in the late inflammatory response phase ot the illness based on the onset of symptoms.  Clinically has a cytokine storm  Seems to be responding to Toci  procalcitonin 0.11  Ferritin 508>656  CRP 80>84  Ddimers 144>170  No bacterial PNA    s/p Toci 5/16  s/p RDV 5/15-19    RECOMMENDATIONS;  Target SpO2 92 % to 96 %  Dexamethasone 6 mg iv q24h for 10 days.  Monitor for side effects: hyperglycemia, neurological ( agitation/confusion), adrenal suppression, bacterial and fungal infections

## 2021-05-19 NOTE — PROGRESS NOTE ADULT - ASSESSMENT
25 years old male with a pmhx of asthma, on day 9 of Covid symptoms presents today for eval of worsening SOB for past 4 days. Patient was placed on 6L NC in ED, overnight patient desaturated and was switched to 10L NRB. This morning saturating 94% on NRB. was placed on RDV and dexamethasone. Patient with worsening CXR and continues to be on NRB saturating low 90s, decision made to give TOCI 800mg x1 (5/16) and transfer to ICU per Dr Li for close monitoring.     Impression:  Severe Covid PNA  Acute Asthma exacerbation    # SOB / moderate - severe COVID - 19 PNA  # Acute asthma exacerbation  - CXR shows b/l pulmonary opacities worsening  - repeat D-Dimer 222-170-94, ferritin 508-656, CRP 80-40, Procal 0.13-0.12-0.04noted.  - now on NRB and HFNC 40L 100% saturating 94%  - monitor O2, titrate O2 as needed to goal of 92 - 96%  - c/w  solumedrol 60 mg iv q 12h  - remdesivir 200mg IV x 1 day followed by 100mg IV x 4 days  - anticoagulation: lovenox 40 bid   - tylenol for fever  - seen by ID, 800mg toci x1 5/16/21. COVID antibody +  - c/w inhalers  - trend D-Dimer, CRP, ferritin q48hrs  -Duplex venous  - s/p LASIX 40 MG iv ONCE 5/19    # Diet: Regular  # DVT Prophylaxis: Lovenox BID 40mg   # GI Prophylaxis: Protonix  # Activity: IAT  # Dispo: Acute  # Code Status: Fullcode

## 2021-05-20 ENCOUNTER — TRANSCRIPTION ENCOUNTER (OUTPATIENT)
Age: 26
End: 2021-05-20

## 2021-05-20 LAB
ALBUMIN SERPL ELPH-MCNC: 3.8 G/DL — SIGNIFICANT CHANGE UP (ref 3.5–5.2)
ALP SERPL-CCNC: 46 U/L — SIGNIFICANT CHANGE UP (ref 30–115)
ALT FLD-CCNC: 72 U/L — HIGH (ref 0–41)
ANION GAP SERPL CALC-SCNC: 15 MMOL/L — HIGH (ref 7–14)
AST SERPL-CCNC: 25 U/L — SIGNIFICANT CHANGE UP (ref 0–41)
BASOPHILS # BLD AUTO: 0.03 K/UL — SIGNIFICANT CHANGE UP (ref 0–0.2)
BASOPHILS NFR BLD AUTO: 0.3 % — SIGNIFICANT CHANGE UP (ref 0–1)
BILIRUB SERPL-MCNC: 0.5 MG/DL — SIGNIFICANT CHANGE UP (ref 0.2–1.2)
BUN SERPL-MCNC: 33 MG/DL — HIGH (ref 10–20)
CALCIUM SERPL-MCNC: 8.7 MG/DL — SIGNIFICANT CHANGE UP (ref 8.5–10.1)
CHLORIDE SERPL-SCNC: 104 MMOL/L — SIGNIFICANT CHANGE UP (ref 98–110)
CO2 SERPL-SCNC: 21 MMOL/L — SIGNIFICANT CHANGE UP (ref 17–32)
CREAT SERPL-MCNC: 0.9 MG/DL — SIGNIFICANT CHANGE UP (ref 0.7–1.5)
EOSINOPHIL # BLD AUTO: 0.06 K/UL — SIGNIFICANT CHANGE UP (ref 0–0.7)
EOSINOPHIL NFR BLD AUTO: 0.7 % — SIGNIFICANT CHANGE UP (ref 0–8)
GLUCOSE SERPL-MCNC: 107 MG/DL — HIGH (ref 70–99)
HCT VFR BLD CALC: 50.4 % — SIGNIFICANT CHANGE UP (ref 42–52)
HGB BLD-MCNC: 16.3 G/DL — SIGNIFICANT CHANGE UP (ref 14–18)
IMM GRANULOCYTES NFR BLD AUTO: 2.7 % — HIGH (ref 0.1–0.3)
LYMPHOCYTES # BLD AUTO: 2.08 K/UL — SIGNIFICANT CHANGE UP (ref 1.2–3.4)
LYMPHOCYTES # BLD AUTO: 23.7 % — SIGNIFICANT CHANGE UP (ref 20.5–51.1)
MAGNESIUM SERPL-MCNC: 2.4 MG/DL — SIGNIFICANT CHANGE UP (ref 1.8–2.4)
MCHC RBC-ENTMCNC: 27.1 PG — SIGNIFICANT CHANGE UP (ref 27–31)
MCHC RBC-ENTMCNC: 32.3 G/DL — SIGNIFICANT CHANGE UP (ref 32–37)
MCV RBC AUTO: 83.7 FL — SIGNIFICANT CHANGE UP (ref 80–94)
MONOCYTES # BLD AUTO: 0.76 K/UL — HIGH (ref 0.1–0.6)
MONOCYTES NFR BLD AUTO: 8.7 % — SIGNIFICANT CHANGE UP (ref 1.7–9.3)
NEUTROPHILS # BLD AUTO: 5.59 K/UL — SIGNIFICANT CHANGE UP (ref 1.4–6.5)
NEUTROPHILS NFR BLD AUTO: 63.9 % — SIGNIFICANT CHANGE UP (ref 42.2–75.2)
NRBC # BLD: 0 /100 WBCS — SIGNIFICANT CHANGE UP (ref 0–0)
PHOSPHATE SERPL-MCNC: 4.1 MG/DL — SIGNIFICANT CHANGE UP (ref 2.1–4.9)
PLATELET # BLD AUTO: 385 K/UL — SIGNIFICANT CHANGE UP (ref 130–400)
POTASSIUM SERPL-MCNC: 4.3 MMOL/L — SIGNIFICANT CHANGE UP (ref 3.5–5)
POTASSIUM SERPL-SCNC: 4.3 MMOL/L — SIGNIFICANT CHANGE UP (ref 3.5–5)
PROCALCITONIN SERPL-MCNC: <0.02 NG/ML — SIGNIFICANT CHANGE UP (ref 0.02–0.1)
PROT SERPL-MCNC: 6.4 G/DL — SIGNIFICANT CHANGE UP (ref 6–8)
RBC # BLD: 6.02 M/UL — SIGNIFICANT CHANGE UP (ref 4.7–6.1)
RBC # FLD: 13.3 % — SIGNIFICANT CHANGE UP (ref 11.5–14.5)
SODIUM SERPL-SCNC: 140 MMOL/L — SIGNIFICANT CHANGE UP (ref 135–146)
WBC # BLD: 8.76 K/UL — SIGNIFICANT CHANGE UP (ref 4.8–10.8)
WBC # FLD AUTO: 8.76 K/UL — SIGNIFICANT CHANGE UP (ref 4.8–10.8)

## 2021-05-20 PROCEDURE — 99232 SBSQ HOSP IP/OBS MODERATE 35: CPT

## 2021-05-20 PROCEDURE — 71045 X-RAY EXAM CHEST 1 VIEW: CPT | Mod: 26

## 2021-05-20 RX ADMIN — Medication 10 MILLILITER(S): at 05:51

## 2021-05-20 RX ADMIN — BUDESONIDE AND FORMOTEROL FUMARATE DIHYDRATE 2 PUFF(S): 160; 4.5 AEROSOL RESPIRATORY (INHALATION) at 20:55

## 2021-05-20 RX ADMIN — PANTOPRAZOLE SODIUM 40 MILLIGRAM(S): 20 TABLET, DELAYED RELEASE ORAL at 08:04

## 2021-05-20 RX ADMIN — BUDESONIDE AND FORMOTEROL FUMARATE DIHYDRATE 2 PUFF(S): 160; 4.5 AEROSOL RESPIRATORY (INHALATION) at 08:04

## 2021-05-20 RX ADMIN — Medication 60 MILLIGRAM(S): at 05:49

## 2021-05-20 RX ADMIN — ENOXAPARIN SODIUM 40 MILLIGRAM(S): 100 INJECTION SUBCUTANEOUS at 17:29

## 2021-05-20 RX ADMIN — CHLORHEXIDINE GLUCONATE 1 APPLICATION(S): 213 SOLUTION TOPICAL at 05:48

## 2021-05-20 RX ADMIN — Medication 60 MILLIGRAM(S): at 17:30

## 2021-05-20 NOTE — CHART NOTE - NSCHARTNOTESELECT_GEN_ALL_CORE
Addended by: GREGORIO VARMA on: 6/7/2018 01:59 PM     Modules accepted: Orders     Downgrade/Transfer Note

## 2021-05-20 NOTE — PROGRESS NOTE ADULT - ASSESSMENT
IMPRESSION:    Acute hypoxemic respiratory failure on 60% improving  Critical COVID-19 PNA  asthma exacerbation better  Doubt superimposed bacterial infection        PLAN:    CNS: Avoid CNS depressants.     HEENT: Oral care    PULMONARY:  HOB @ 45 degrees.  Aspiration precautions. Target SpO2 94-96%, wean oxygen as tolerated. solumedrol q 12h. Duonebs PRN.  Incentive Spirometry    CARDIOVASCULAR: Avoid volume overload,    GI: GI prophylaxis. Feeding as tolerated.  Bowel regimen     RENAL: Follow up lytes. Correct as needed.     INFECTIOUS DISEASE: TOCI.  Monitor inflammatory markers     HEMATOLOGICAL:  DVT prophylaxis with LMWH.     ENDOCRINE:  Follow up FS.     MUSCULOSKELETAL: OOB to chair    CEU

## 2021-05-20 NOTE — PROGRESS NOTE ADULT - ASSESSMENT
· Assessment	  25 years old male with a pmhx of asthma, on day 9 of Covid symptoms presents today for eval of worsening SOB for past 4 days.     IMPRESSION;  COVID 19 with improvement in oxygenation  Pt is in the late inflammatory response phase ot the illness based on the onset of symptoms.  Clinically has a cytokine storm  Responding to Toci  Decreasing inflammatory markers  procalcitonin 0.11>0.04  Ferritin 508>656  CRP 80>84>22  Ddimers 144>170>93  No bacterial PNA    s/p Toci 5/16  s/p RDV 5/15-19    RECOMMENDATIONS;  Target SpO2 92 % to 96 %  Dexamethasone 6 mg iv q24h for 10 days.  Monitor for side effects: hyperglycemia, neurological ( agitation/confusion), adrenal suppression, bacterial and fungal infections

## 2021-05-21 ENCOUNTER — TRANSCRIPTION ENCOUNTER (OUTPATIENT)
Age: 26
End: 2021-05-21

## 2021-05-21 LAB
ALBUMIN SERPL ELPH-MCNC: 3.8 G/DL — SIGNIFICANT CHANGE UP (ref 3.5–5.2)
ALP SERPL-CCNC: 46 U/L — SIGNIFICANT CHANGE UP (ref 30–115)
ALT FLD-CCNC: 62 U/L — HIGH (ref 0–41)
ANION GAP SERPL CALC-SCNC: 12 MMOL/L — SIGNIFICANT CHANGE UP (ref 7–14)
AST SERPL-CCNC: 20 U/L — SIGNIFICANT CHANGE UP (ref 0–41)
BASOPHILS # BLD AUTO: 0.03 K/UL — SIGNIFICANT CHANGE UP (ref 0–0.2)
BASOPHILS NFR BLD AUTO: 0.2 % — SIGNIFICANT CHANGE UP (ref 0–1)
BILIRUB SERPL-MCNC: 0.4 MG/DL — SIGNIFICANT CHANGE UP (ref 0.2–1.2)
BUN SERPL-MCNC: 24 MG/DL — HIGH (ref 10–20)
CALCIUM SERPL-MCNC: 8.8 MG/DL — SIGNIFICANT CHANGE UP (ref 8.5–10.1)
CHLORIDE SERPL-SCNC: 104 MMOL/L — SIGNIFICANT CHANGE UP (ref 98–110)
CO2 SERPL-SCNC: 23 MMOL/L — SIGNIFICANT CHANGE UP (ref 17–32)
CREAT SERPL-MCNC: 0.8 MG/DL — SIGNIFICANT CHANGE UP (ref 0.7–1.5)
EOSINOPHIL # BLD AUTO: 0.01 K/UL — SIGNIFICANT CHANGE UP (ref 0–0.7)
EOSINOPHIL NFR BLD AUTO: 0.1 % — SIGNIFICANT CHANGE UP (ref 0–8)
GLUCOSE SERPL-MCNC: 157 MG/DL — HIGH (ref 70–99)
HCT VFR BLD CALC: 47.7 % — SIGNIFICANT CHANGE UP (ref 42–52)
HGB BLD-MCNC: 15.6 G/DL — SIGNIFICANT CHANGE UP (ref 14–18)
IMM GRANULOCYTES NFR BLD AUTO: 2.3 % — HIGH (ref 0.1–0.3)
LYMPHOCYTES # BLD AUTO: 1.16 K/UL — LOW (ref 1.2–3.4)
LYMPHOCYTES # BLD AUTO: 9.5 % — LOW (ref 20.5–51.1)
MCHC RBC-ENTMCNC: 27.7 PG — SIGNIFICANT CHANGE UP (ref 27–31)
MCHC RBC-ENTMCNC: 32.7 G/DL — SIGNIFICANT CHANGE UP (ref 32–37)
MCV RBC AUTO: 84.7 FL — SIGNIFICANT CHANGE UP (ref 80–94)
MONOCYTES # BLD AUTO: 0.3 K/UL — SIGNIFICANT CHANGE UP (ref 0.1–0.6)
MONOCYTES NFR BLD AUTO: 2.4 % — SIGNIFICANT CHANGE UP (ref 1.7–9.3)
NEUTROPHILS # BLD AUTO: 10.49 K/UL — HIGH (ref 1.4–6.5)
NEUTROPHILS NFR BLD AUTO: 85.5 % — HIGH (ref 42.2–75.2)
NRBC # BLD: 0 /100 WBCS — SIGNIFICANT CHANGE UP (ref 0–0)
PLATELET # BLD AUTO: 368 K/UL — SIGNIFICANT CHANGE UP (ref 130–400)
POTASSIUM SERPL-MCNC: 5.2 MMOL/L — HIGH (ref 3.5–5)
POTASSIUM SERPL-SCNC: 5.2 MMOL/L — HIGH (ref 3.5–5)
PROT SERPL-MCNC: 6.1 G/DL — SIGNIFICANT CHANGE UP (ref 6–8)
RBC # BLD: 5.63 M/UL — SIGNIFICANT CHANGE UP (ref 4.7–6.1)
RBC # FLD: 13.2 % — SIGNIFICANT CHANGE UP (ref 11.5–14.5)
SODIUM SERPL-SCNC: 139 MMOL/L — SIGNIFICANT CHANGE UP (ref 135–146)
WBC # BLD: 12.27 K/UL — HIGH (ref 4.8–10.8)
WBC # FLD AUTO: 12.27 K/UL — HIGH (ref 4.8–10.8)

## 2021-05-21 PROCEDURE — 99232 SBSQ HOSP IP/OBS MODERATE 35: CPT

## 2021-05-21 PROCEDURE — 71045 X-RAY EXAM CHEST 1 VIEW: CPT | Mod: 26

## 2021-05-21 PROCEDURE — 99233 SBSQ HOSP IP/OBS HIGH 50: CPT

## 2021-05-21 RX ORDER — DEXAMETHASONE 0.5 MG/5ML
2 ELIXIR ORAL DAILY
Refills: 0 | Status: CANCELLED | OUTPATIENT
Start: 2021-05-28 | End: 2021-05-22

## 2021-05-21 RX ORDER — DEXAMETHASONE 0.5 MG/5ML
4 ELIXIR ORAL DAILY
Refills: 0 | Status: CANCELLED | OUTPATIENT
Start: 2021-05-25 | End: 2021-05-22

## 2021-05-21 RX ORDER — DEXAMETHASONE 0.5 MG/5ML
6 ELIXIR ORAL DAILY
Refills: 0 | Status: DISCONTINUED | OUTPATIENT
Start: 2021-05-22 | End: 2021-05-22

## 2021-05-21 RX ADMIN — BUDESONIDE AND FORMOTEROL FUMARATE DIHYDRATE 2 PUFF(S): 160; 4.5 AEROSOL RESPIRATORY (INHALATION) at 08:13

## 2021-05-21 RX ADMIN — BUDESONIDE AND FORMOTEROL FUMARATE DIHYDRATE 2 PUFF(S): 160; 4.5 AEROSOL RESPIRATORY (INHALATION) at 20:42

## 2021-05-21 RX ADMIN — ENOXAPARIN SODIUM 40 MILLIGRAM(S): 100 INJECTION SUBCUTANEOUS at 17:08

## 2021-05-21 RX ADMIN — Medication 60 MILLIGRAM(S): at 05:12

## 2021-05-21 RX ADMIN — ENOXAPARIN SODIUM 40 MILLIGRAM(S): 100 INJECTION SUBCUTANEOUS at 05:11

## 2021-05-21 RX ADMIN — PANTOPRAZOLE SODIUM 40 MILLIGRAM(S): 20 TABLET, DELAYED RELEASE ORAL at 05:12

## 2021-05-21 NOTE — PROGRESS NOTE ADULT - ASSESSMENT
· Assessment	  25 years old male with a pmhx of asthma, on day 9 of Covid symptoms presents today for eval of worsening SOB for past 4 days.     IMPRESSION;  COVID 19 with improvement in oxygenation  Pt was in the late inflammatory response phase ot the illness based on the onset of symptoms.  Clinically has a cytokine storm  Responding to Toci  Decreasing inflammatory markers  procalcitonin 0.11>0.04  Ferritin 508>656  CRP 80>84>22  Ddimers 144>170>93  No bacterial PNA    s/p Toci 5/16  s/p RDV 5/15-19    RECOMMENDATIONS;  Target SpO2 92 % to 96 %  Dexamethasone 6 mg iv q24h for 10 days.  Monitor for side effects: hyperglycemia, neurological ( agitation/confusion), adrenal suppression, bacterial and fungal infections  Could finish course of steroids with po prednisone 40 mg q24 for a total of 10 days till 5/25  recall prn please

## 2021-05-21 NOTE — DISCHARGE NOTE PROVIDER - HOSPITAL COURSE
25 years old male with a PMH of asthma presented on day 9 of COVID symptoms for worsening SOB x4 days, admitted for acute hypoxemic respiratory failure 2/2 COVID-19 PNA. He received supplemental O2 through NRB/HFNC and was upgraded to the ICU. He received RDV course and toci and was started on Solumedrol given hx of asthma. He remained stable and was weaned down to O2 via NC. He is stable for discharge and can complete a Decadron taper outpatient.

## 2021-05-21 NOTE — DISCHARGE NOTE PROVIDER - NSDCCPCAREPLAN_GEN_ALL_CORE_FT
PRINCIPAL DISCHARGE DIAGNOSIS  Diagnosis: Acute respiratory failure with hypoxia  Assessment and Plan of Treatment: Coronavirus disease 2019 (COVID-19) is a respiratory illness  that can spread from person to person.   The virus is thought to spread mainly between people who  are in close contact with one another (within about 6 feet)  through respiratory droplets produced when an infected  person coughs or sneezes. It also may be possible that a person  can get COVID-19 by touching a surface or object that has  the virus on it and then touching their own mouth, nose, or  possibly their eyes, but this is not thought to be the main  way the virus spreads.  Please stay home and avoid contact with others for at least a week after symptoms resolve and follow government guidelines.   Patients with COVID-19 have had mild to severe respiratory  illness with symptoms of  • fever  • cough  • shortness of breath  People can help protect themselves from respiratory illness with  everyday preventive actions.    • Avoid close contact with people who are sick.  • Avoid touching your eyes, nose, and mouth with  unwashed hands.  • Wash your hands often with soap and water for at least 20   seconds. Use an alcohol-based hand  that contains at  least 60% alcohol if soap and water are not available.   Stay home when you are sick.  • Cover your cough or sneeze with a tissue, then throw the  tissue in the trash.  • Clean and disinfect frequently touched objects  and surfaces.  Call 911 and inform them you are covid positive before you decide to go to the emergency room if you have chest pain, difficulty breathing, high fevers, worsening of your symptoms, feel unwell, or have nausea and vomiting.        SECONDARY DISCHARGE DIAGNOSES  Diagnosis: Dyspnea  Assessment and Plan of Treatment:     Diagnosis: Asthma  Assessment and Plan of Treatment:     Diagnosis: COVID-19  Assessment and Plan of Treatment:

## 2021-05-21 NOTE — DIETITIAN INITIAL EVALUATION ADULT. - OTHER INFO
Pt p/w worsening SOB x4d PTA. tested positive for COVID-19 x9d PTA. Hospital course complicated by acute hypoxemic respiratory failure 2/2 COVID-19 PNA; acute asthma exacerbation--weaned to 4L NC; s/p RDV x5d, toci 5/16.

## 2021-05-21 NOTE — PROGRESS NOTE ADULT - ASSESSMENT
IMPRESSION:    Acute hypoxemic respiratory failure improved   Critical COVID-19 PNA SP TOCI   asthma exacerbation improved   Doubt superimposed bacterial infection        PLAN:    CNS: Avoid CNS depressants.     HEENT: Oral care    PULMONARY:  HOB @ 45 degrees.  Aspiration precautions. Target SpO2 94-96%.  Wean oxygen as tolerated.  DC Solumedrol q 12h.  Oral Dexa taper.  Symbicort and Albuterol.      CARDIOVASCULAR: Avoid volume overload,    GI: GI prophylaxis. Feeding as tolerated.  Bowel regimen     RENAL: Follow up lytes. Correct as needed.     INFECTIOUS DISEASE: Monitor off ABX     HEMATOLOGICAL:  DVT prophylaxis with LMWH.     ENDOCRINE:  Follow up FS.     MUSCULOSKELETAL: OOB to chair.  Ambulate and check O2.    DC planing     OP pulm follow up

## 2021-05-21 NOTE — DISCHARGE NOTE PROVIDER - CARE PROVIDER_API CALL
[place]  INFECTIOUS DISEASE  [address] [place] [id number]  Phone: [phone]  Fax: [phone]  Follow Up Time:    [facility] [place]  INFECTIOUS DISEASE  [address] [place] [address]  Phone: [phone]  Fax: [phone]  Follow Up Time:     Donald Li)  Critical Care Medicine; Pulmonary Disease; Sleep Medicine  [address] [place] [address]  Phone: [phone]  Fax: [phone]  Follow Up Time: 2 weeks

## 2021-05-21 NOTE — PROGRESS NOTE ADULT - ASSESSMENT
25 years old obese male with a pmhx of asthma, presented with worsening SOB, admitted for acute hypoxic respiratory failure secondary to Covid, was downgraded from the ICU. Currently stable    Acute Hypoxemic respiratory failure secondary to COVID 19 PNA  Asthma  Obesity  initially on HFNC, downgraded from ICU, currently on 4L NC saturating 94%  s/p RDV and Toci 5/16  currently on Solumedrol 60 Q12H, can switch to decadron 6mg PO and taper down by 2mg q3days  continue with lovenox q12h for dvt ppx  87% on rooma air at rest, Cm working on home o2  check o2 on ambulation    continue with monitoring in CEU  dc planning over the weekend if continues to improve

## 2021-05-21 NOTE — DISCHARGE NOTE PROVIDER - PROVIDER TOKENS
PROVIDER:[TOKEN:[[id number]]] PROVIDER:[TOKEN:[[id number]]],PROVIDER:[TOKEN:[[id number]],FOLLOWUP:[2 weeks]]

## 2021-05-21 NOTE — DIETITIAN INITIAL EVALUATION ADULT. - ENERGY INTAKE
Good (>75%) As per staff, pt w. adequate intake consuming % of Regular diet. No trouble chewing/swallowing noted. no further nutrition interventions at this time.

## 2021-05-21 NOTE — DIETITIAN INITIAL EVALUATION ADULT. - ADD RECOMMEND
Pt to continue to consume >75% meals & snacks throughout LOS. RD to reassess in 7 days. RD will monitor diet order, energy intake, nutrition related labs, body composition, NFPF

## 2021-05-21 NOTE — DISCHARGE NOTE PROVIDER - NSDCMRMEDTOKEN_GEN_ALL_CORE_FT
Albuterol (Eqv-ProAir HFA) 90 mcg/inh inhalation aerosol: 2 puff(s) inhaled every 6 hours   Albuterol (Eqv-ProAir HFA) 90 mcg/inh inhalation aerosol: 2 puff(s) inhaled every 6 hours  budesonide-formoterol 80 mcg-4.5 mcg/inh inhalation aerosol: 1 puff(s) inhaled 2 times a day   dexamethasone 2 mg oral tablet: 1 tab(s) orally once a day  dexamethasone 4 mg oral tablet: 1 tab(s) orally once a day  dexamethasone 6 mg oral tablet: 1 tab(s) orally once a day  pantoprazole 40 mg oral delayed release tablet: 1 tab(s) orally once a day (before a meal)

## 2021-05-21 NOTE — PROGRESS NOTE ADULT - ASSESSMENT
26 yo M with PMH of asthma admitted on day 9 of COVID symptoms for acute hypoxemic respiratory failure.    #Acute hypoxemic respiratory failure 2/2 COVID - 19 PNA  #Acute asthma exacerbation  - Was on NRB/HFNC -> now on 4 L NC  - CXE stable  - Procal 0.02; CRP 22; ferritin 656; dimer 93  - Was on solumedrol    - c/w  solumedrol 60 mg iv q 12h  - Remdesivir 200mg IV x 1 day followed by 100mg IV x 4 days  - anticoagulation: Lovenox 40 bid   - tylenols for fever  - seen by ID, 800mg toci x1 5/16/21. COVID antibody +  - c/w inhalers  - trend D-Dimer, CRP, ferritin q48hrs  - Duplex venous negative 5/18  - s/p LASIX 40 MG iv ONCE 5/19    # Diet: Regular  # DVT Prophylaxis: Lovenox BID 40mg   # GI Prophylaxis: Protonix  # Activity: IAT  # Dispo: Acute  # Code Status: Full code. 26 yo M with PMH of asthma admitted on day 9 of COVID symptoms for acute hypoxemic respiratory failure.    #Acute hypoxemic respiratory failure 2/2 COVID - 19 PNA  #Acute asthma exacerbation  - Was on NRB/HFNC -> now on 4 L NC  - CXE stable  - Procal 0.02; CRP 22; ferritin 656; dimer 93  - Solumedrol IV -> start PO Decadron taper on 5/22 (6 mg x3 days, 4 mg x3 days, 2 mg x3 days)  - s/p RDV x5 days ended 5/19, toci 800 mg 5/16  - c/w inhalers  - Duplex venous negative 5/18    #Diet: Regular  #DVT Prophylaxis: Lovenox BID 40mg   #GI Prophylaxis: Protonix  #Activity: IAT  #Dispo: Acute    #Code Status: Full code      Letter of medical necessity  Despite treatment with steroids, patient remains hypoxic secondary to COVID-19 pneumonia. The patient's oxygen saturation at rest on room air is 91%, with exercise on room air 87%, with ambulation on 4 L NC 93%. The patient was tested in a chronic stable state. The patient is aware he is going home on oxygen.

## 2021-05-22 ENCOUNTER — TRANSCRIPTION ENCOUNTER (OUTPATIENT)
Age: 26
End: 2021-05-22

## 2021-05-22 VITALS
SYSTOLIC BLOOD PRESSURE: 123 MMHG | OXYGEN SATURATION: 96 % | DIASTOLIC BLOOD PRESSURE: 69 MMHG | RESPIRATION RATE: 17 BRPM | TEMPERATURE: 97 F | HEART RATE: 86 BPM

## 2021-05-22 LAB
ANION GAP SERPL CALC-SCNC: 10 MMOL/L — SIGNIFICANT CHANGE UP (ref 7–14)
BASOPHILS # BLD AUTO: 0.01 K/UL — SIGNIFICANT CHANGE UP (ref 0–0.2)
BASOPHILS NFR BLD AUTO: 0.1 % — SIGNIFICANT CHANGE UP (ref 0–1)
BUN SERPL-MCNC: 21 MG/DL — HIGH (ref 10–20)
CALCIUM SERPL-MCNC: 8.6 MG/DL — SIGNIFICANT CHANGE UP (ref 8.5–10.1)
CHLORIDE SERPL-SCNC: 102 MMOL/L — SIGNIFICANT CHANGE UP (ref 98–110)
CO2 SERPL-SCNC: 26 MMOL/L — SIGNIFICANT CHANGE UP (ref 17–32)
CREAT SERPL-MCNC: 0.8 MG/DL — SIGNIFICANT CHANGE UP (ref 0.7–1.5)
EOSINOPHIL # BLD AUTO: 0.12 K/UL — SIGNIFICANT CHANGE UP (ref 0–0.7)
EOSINOPHIL NFR BLD AUTO: 1.3 % — SIGNIFICANT CHANGE UP (ref 0–8)
GLUCOSE SERPL-MCNC: 108 MG/DL — HIGH (ref 70–99)
HCT VFR BLD CALC: 47 % — SIGNIFICANT CHANGE UP (ref 42–52)
HGB BLD-MCNC: 15.2 G/DL — SIGNIFICANT CHANGE UP (ref 14–18)
IMM GRANULOCYTES NFR BLD AUTO: 2.6 % — HIGH (ref 0.1–0.3)
LYMPHOCYTES # BLD AUTO: 2.89 K/UL — SIGNIFICANT CHANGE UP (ref 1.2–3.4)
LYMPHOCYTES # BLD AUTO: 31.2 % — SIGNIFICANT CHANGE UP (ref 20.5–51.1)
MAGNESIUM SERPL-MCNC: 2.4 MG/DL — SIGNIFICANT CHANGE UP (ref 1.8–2.4)
MCHC RBC-ENTMCNC: 27.4 PG — SIGNIFICANT CHANGE UP (ref 27–31)
MCHC RBC-ENTMCNC: 32.3 G/DL — SIGNIFICANT CHANGE UP (ref 32–37)
MCV RBC AUTO: 84.7 FL — SIGNIFICANT CHANGE UP (ref 80–94)
MONOCYTES # BLD AUTO: 0.64 K/UL — HIGH (ref 0.1–0.6)
MONOCYTES NFR BLD AUTO: 6.9 % — SIGNIFICANT CHANGE UP (ref 1.7–9.3)
NEUTROPHILS # BLD AUTO: 5.35 K/UL — SIGNIFICANT CHANGE UP (ref 1.4–6.5)
NEUTROPHILS NFR BLD AUTO: 57.9 % — SIGNIFICANT CHANGE UP (ref 42.2–75.2)
NRBC # BLD: 0 /100 WBCS — SIGNIFICANT CHANGE UP (ref 0–0)
PLATELET # BLD AUTO: 329 K/UL — SIGNIFICANT CHANGE UP (ref 130–400)
POTASSIUM SERPL-MCNC: 5 MMOL/L — SIGNIFICANT CHANGE UP (ref 3.5–5)
POTASSIUM SERPL-SCNC: 5 MMOL/L — SIGNIFICANT CHANGE UP (ref 3.5–5)
RBC # BLD: 5.55 M/UL — SIGNIFICANT CHANGE UP (ref 4.7–6.1)
RBC # FLD: 13.1 % — SIGNIFICANT CHANGE UP (ref 11.5–14.5)
SODIUM SERPL-SCNC: 138 MMOL/L — SIGNIFICANT CHANGE UP (ref 135–146)
WBC # BLD: 9.25 K/UL — SIGNIFICANT CHANGE UP (ref 4.8–10.8)
WBC # FLD AUTO: 9.25 K/UL — SIGNIFICANT CHANGE UP (ref 4.8–10.8)

## 2021-05-22 PROCEDURE — 99239 HOSP IP/OBS DSCHRG MGMT >30: CPT

## 2021-05-22 RX ORDER — PANTOPRAZOLE SODIUM 20 MG/1
1 TABLET, DELAYED RELEASE ORAL
Qty: 8 | Refills: 0
Start: 2021-05-22 | End: 2021-05-29

## 2021-05-22 RX ORDER — BUDESONIDE AND FORMOTEROL FUMARATE DIHYDRATE 160; 4.5 UG/1; UG/1
1 AEROSOL RESPIRATORY (INHALATION)
Qty: 1 | Refills: 0
Start: 2021-05-22 | End: 2021-06-20

## 2021-05-22 RX ORDER — BUDESONIDE AND FORMOTEROL FUMARATE DIHYDRATE 160; 4.5 UG/1; UG/1
1 AEROSOL RESPIRATORY (INHALATION)
Qty: 60 | Refills: 0
Start: 2021-05-22 | End: 2021-06-20

## 2021-05-22 RX ADMIN — ENOXAPARIN SODIUM 40 MILLIGRAM(S): 100 INJECTION SUBCUTANEOUS at 05:22

## 2021-05-22 RX ADMIN — PANTOPRAZOLE SODIUM 40 MILLIGRAM(S): 20 TABLET, DELAYED RELEASE ORAL at 06:32

## 2021-05-22 RX ADMIN — Medication 6 MILLIGRAM(S): at 05:22

## 2021-05-22 NOTE — PROGRESS NOTE ADULT - ASSESSMENT
25 years old obese male with a pmhx of asthma, presented with worsening SOB, admitted for acute hypoxic respiratory failure secondary to Covid, was downgraded from the ICU. Currently stable    Acute Hypoxemic respiratory failure secondary to COVID 19 PNA  Asthma  Obesity  initially on HFNC, downgraded from ICU, currently on 3L NC saturating 97%  s/p RDV and Toci 5/16  c/w decadron 6mg PO for 2 more days and taper down by 2mg q3days  continue with lovenox q12h for dvt ppx  87% on room air at rest, Cm working on home o2    Patient is medically stable for discharge home today, pending home 02 delivery. He is aware and agreeable with the plan

## 2021-05-22 NOTE — PROGRESS NOTE ADULT - SUBJECTIVE AND OBJECTIVE BOX
OVERNIGHT EVENTS: events noted, on FNC 40/100, nrm    Vital Signs Last 24 Hrs  T(C): 37.2 (18 May 2021 08:00), Max: 37.4 (17 May 2021 16:00)  T(F): 98.9 (18 May 2021 08:00), Max: 99.3 (17 May 2021 16:00)  HR: 94 (18 May 2021 08:00) (76 - 112)  BP: 141/76 (18 May 2021 08:00) (110/79 - 141/76)  BP(mean): 93 (18 May 2021 08:00) (77 - 102)  RR: 32 (18 May 2021 08:00) (24 - 42)  SpO2: 95% (18 May 2021 08:00) (85% - 97%)    PHYSICAL EXAMINATION:    GENERAL: ill looking    HEENT: Head is normocephalic and atraumatic. Extraocular muscles are intact. Mucous membranes are moist.    NECK: Supple.    LUNGS: bl crackles/ wheezing    HEART: Regular rate and rhythm without murmur.    ABDOMEN: Soft, nontender, and nondistended.      EXTREMITIES: Without any cyanosis, clubbing, rash, lesions or edema.    NEUROLOGIC: Grossly intact.    SKIN: No ulceration or induration present.      LABS:                        15.6   11.75 )-----------( 352      ( 18 May 2021 04:45 )             47.1     05-18    138  |  101  |  22<H>  ----------------------------<  166<H>  4.7   |  23  |  0.7    Ca    8.9      18 May 2021 04:45  Mg     2.2     05-17    TPro  6.2  /  Alb  3.6  /  TBili  0.3  /  DBili  x   /  AST  26  /  ALT  58<H>  /  AlkPhos  49  05-18              D-Dimer Assay, Quantitative: 170 ng/mL DDU (05-18-21 @ 04:45)        Procalcitonin, Serum: 0.11 ng/mL (05-16-21 @ 10:46)        05-17-21 @ 07:01  -  05-18-21 @ 07:00  --------------------------------------------------------  IN: 490 mL / OUT: 1730 mL / NET: -1240 mL        MICROBIOLOGY:      MEDICATIONS  (STANDING):  albuterol/ipratropium for Nebulization.. 3 milliLiter(s) Nebulizer every 20 minutes  budesonide  80 MICROgram(s)/formoterol 4.5 MICROgram(s) Inhaler 2 Puff(s) Inhalation two times a day  chlorhexidine 4% Liquid 1 Application(s) Topical <User Schedule>  enoxaparin Injectable 40 milliGRAM(s) SubCutaneous every 12 hours  guaifenesin/dextromethorphan Oral Liquid 10 milliLiter(s) Oral every 6 hours  methylPREDNISolone sodium succinate Injectable 60 milliGRAM(s) IV Push every 8 hours  pantoprazole    Tablet 40 milliGRAM(s) Oral before breakfast  remdesivir  IVPB   IV Intermittent   remdesivir  IVPB 100 milliGRAM(s) IV Intermittent every 24 hours    MEDICATIONS  (PRN):  acetaminophen   Tablet .. 650 milliGRAM(s) Oral every 6 hours PRN Temp greater or equal to 38C (100.4F), Moderate Pain (4 - 6)  ALBUTerol  90 MICROgram(s) HFA Inhaler - Peds 2 Puff(s) Inhalation every 2 hours PRN Bronchospasm      RADIOLOGY & ADDITIONAL STUDIES:    
    OVERNIGHT EVENTS: events noted, on HHFNC 60^%, 40 L    Vital Signs Last 24 Hrs  T(C): 36.7 (19 May 2021 20:00), Max: 36.8 (19 May 2021 12:00)  T(F): 98.1 (19 May 2021 20:00), Max: 98.2 (19 May 2021 12:00)  HR: 80 (20 May 2021 08:00) (58 - 94)  BP: 95/65 (20 May 2021 08:00) (95/65 - 126/70)  BP(mean): 84 (20 May 2021 08:00) (75 - 92)  RR: 22 (20 May 2021 08:00) (0 - 44)  SpO2: 97% (20 May 2021 08:00) (93% - 100%)    PHYSICAL EXAMINATION:    GENERAL: ill looking    HEENT: Head is normocephalic and atraumatic.    NECK: Supple.    LUNGS: bl rhonchi    HEART: Regular rate and rhythm without murmur.    ABDOMEN: Soft, nontender, and nondistended.      EXTREMITIES: Without any cyanosis, clubbing, rash, lesions or edema.    NEUROLOGIC: Grossly intact.    SKIN: No ulceration or induration present.      LABS:                        15.7   16.90 )-----------( 436      ( 19 May 2021 05:00 )             47.4     05-19    138  |  103  |  30<H>  ----------------------------<  189<H>  4.8   |  23  |  1.0    Ca    9.0      19 May 2021 05:00  Phos  5.3     05-19  Mg     2.5     05-19    TPro  6.3  /  Alb  3.7  /  TBili  0.4  /  DBili  x   /  AST  25  /  ALT  64<H>  /  AlkPhos  49  05-19                    Procalcitonin, Serum: 0.04 ng/mL (05-18-21 @ 04:45)        05-19-21 @ 07:01  -  05-20-21 @ 07:00  --------------------------------------------------------  IN: 1100 mL / OUT: 900 mL / NET: 200 mL        MICROBIOLOGY:      MEDICATIONS  (STANDING):  budesonide  80 MICROgram(s)/formoterol 4.5 MICROgram(s) Inhaler 2 Puff(s) Inhalation two times a day  chlorhexidine 4% Liquid 1 Application(s) Topical <User Schedule>  enoxaparin Injectable 40 milliGRAM(s) SubCutaneous every 12 hours  methylPREDNISolone sodium succinate Injectable 60 milliGRAM(s) IV Push every 12 hours  pantoprazole    Tablet 40 milliGRAM(s) Oral before breakfast    MEDICATIONS  (PRN):  acetaminophen   Tablet .. 650 milliGRAM(s) Oral every 6 hours PRN Temp greater or equal to 38C (100.4F), Moderate Pain (4 - 6)  ALBUTerol  90 MICROgram(s) HFA Inhaler - Peds 2 Puff(s) Inhalation every 2 hours PRN Bronchospasm      RADIOLOGY & ADDITIONAL STUDIES:    
  KENYETTA CHEN  25y, Male    All available historical data reviewed    OVERNIGHT EVENTS:    no fevers  HFNC+NRB  feels well and has no new complaints   ROS:  General: Denies rigors, nightsweats  HEENT: Denies headache, rhinorrhea, sore throat, eye pain  CV: Denies CP, palpitations  PULM: Denies wheezing, hemoptysis  GI: Denies hematemesis, hematochezia, melena  : Denies discharge, hematuria  MSK: Denies arthralgias, myalgias  SKIN: Denies rash, lesions  NEURO: Denies paresthesias, weakness  PSYCH: Denies depression, anxiety    VITALS:  T(F): 98.2, Max: 99 (05-18-21 @ 16:00)  HR: 94  BP: 121/70  RR: 14Vital Signs Last 24 Hrs  T(C): 36.8 (19 May 2021 12:00), Max: 37.2 (18 May 2021 16:00)  T(F): 98.2 (19 May 2021 12:00), Max: 99 (18 May 2021 16:00)  HR: 94 (19 May 2021 13:00) (66 - 100)  BP: 121/70 (19 May 2021 13:00) (85/48 - 133/67)  BP(mean): 85 (19 May 2021 13:00) (64 - 98)  RR: 14 (19 May 2021 13:00) (14 - 30)  SpO2: 93% (19 May 2021 13:00) (91% - 98%)    TESTS & MEASUREMENTS:                        15.7   16.90 )-----------( 436      ( 19 May 2021 05:00 )             47.4     05-19    138  |  103  |  30<H>  ----------------------------<  189<H>  4.8   |  23  |  1.0    Ca    9.0      19 May 2021 05:00  Phos  5.3     05-19  Mg     2.5     05-19    TPro  6.3  /  Alb  3.7  /  TBili  0.4  /  DBili  x   /  AST  25  /  ALT  64<H>  /  AlkPhos  49  05-19    LIVER FUNCTIONS - ( 19 May 2021 05:00 )  Alb: 3.7 g/dL / Pro: 6.3 g/dL / ALK PHOS: 49 U/L / ALT: 64 U/L / AST: 25 U/L / GGT: x                   RADIOLOGY & ADDITIONAL TESTS:  Personal review of radiological diagnostics performed  Echo and EKG results noted when applicable.     MEDICATIONS:  acetaminophen   Tablet .. 650 milliGRAM(s) Oral every 6 hours PRN  ALBUTerol  90 MICROgram(s) HFA Inhaler - Peds 2 Puff(s) Inhalation every 2 hours PRN  albuterol/ipratropium for Nebulization.. 3 milliLiter(s) Nebulizer every 20 minutes  budesonide  80 MICROgram(s)/formoterol 4.5 MICROgram(s) Inhaler 2 Puff(s) Inhalation two times a day  chlorhexidine 4% Liquid 1 Application(s) Topical <User Schedule>  enoxaparin Injectable 40 milliGRAM(s) SubCutaneous every 12 hours  guaifenesin/dextromethorphan Oral Liquid 10 milliLiter(s) Oral every 6 hours  methylPREDNISolone sodium succinate Injectable 60 milliGRAM(s) IV Push every 12 hours  pantoprazole    Tablet 40 milliGRAM(s) Oral before breakfast      ANTIBIOTICS:    
  KENYETTA CHEN  25y, Male    All available historical data reviewed    OVERNIGHT EVENTS:  no fevers  no pressors  HFNC+NRB  no cough/CP    ROS:  General: Denies rigors, nightsweats  HEENT: Denies headache, rhinorrhea, sore throat, eye pain  CV: Denies CP, palpitations  PULM: Denies wheezing, hemoptysis  GI: Denies hematemesis, hematochezia, melena  : Denies discharge, hematuria  MSK: Denies arthralgias, myalgias  SKIN: Denies rash, lesions  NEURO: Denies paresthesias, weakness  PSYCH: Denies depression, anxiety    VITALS:  T(F): 98.9, Max: 99.3 (05-17-21 @ 16:00)  HR: 94  BP: 141/76  RR: 32Vital Signs Last 24 Hrs  T(C): 37.2 (18 May 2021 08:00), Max: 37.4 (17 May 2021 16:00)  T(F): 98.9 (18 May 2021 08:00), Max: 99.3 (17 May 2021 16:00)  HR: 94 (18 May 2021 08:00) (76 - 112)  BP: 141/76 (18 May 2021 08:00) (110/79 - 141/76)  BP(mean): 93 (18 May 2021 08:00) (77 - 102)  RR: 32 (18 May 2021 08:00) (24 - 43)  SpO2: 95% (18 May 2021 08:00) (85% - 97%)    TESTS & MEASUREMENTS:                        15.6   11.75 )-----------( 352      ( 18 May 2021 04:45 )             47.1     05-18    138  |  101  |  22<H>  ----------------------------<  166<H>  4.7   |  23  |  0.7    Ca    8.9      18 May 2021 04:45  Mg     2.2     05-17    TPro  6.2  /  Alb  3.6  /  TBili  0.3  /  DBili  x   /  AST  26  /  ALT  58<H>  /  AlkPhos  49  05-18    LIVER FUNCTIONS - ( 18 May 2021 04:45 )  Alb: 3.6 g/dL / Pro: 6.2 g/dL / ALK PHOS: 49 U/L / ALT: 58 U/L / AST: 26 U/L / GGT: x                   RADIOLOGY & ADDITIONAL TESTS:  Personal review of radiological diagnostics performed  Echo and EKG results noted when applicable.     MEDICATIONS:  acetaminophen   Tablet .. 650 milliGRAM(s) Oral every 6 hours PRN  ALBUTerol  90 MICROgram(s) HFA Inhaler - Peds 2 Puff(s) Inhalation every 2 hours PRN  albuterol/ipratropium for Nebulization.. 3 milliLiter(s) Nebulizer every 20 minutes  budesonide  80 MICROgram(s)/formoterol 4.5 MICROgram(s) Inhaler 2 Puff(s) Inhalation two times a day  chlorhexidine 4% Liquid 1 Application(s) Topical <User Schedule>  enoxaparin Injectable 40 milliGRAM(s) SubCutaneous every 12 hours  guaifenesin/dextromethorphan Oral Liquid 10 milliLiter(s) Oral every 6 hours  methylPREDNISolone sodium succinate Injectable 60 milliGRAM(s) IV Push every 8 hours  pantoprazole    Tablet 40 milliGRAM(s) Oral before breakfast  remdesivir  IVPB   IV Intermittent   remdesivir  IVPB 100 milliGRAM(s) IV Intermittent every 24 hours      ANTIBIOTICS:  remdesivir  IVPB   IV Intermittent   remdesivir  IVPB 100 milliGRAM(s) IV Intermittent every 24 hours    
  KENYETTA CHEN  25y, Male  Allergy: No Known Allergies    Hospital Day: 2d    Patient seen and examined earlier today. No acute events overnight.    PMH/PSH:  PAST MEDICAL & SURGICAL HISTORY:  Asthma    VITALS:  T(F): 101.1 (05-16-21 @ 08:09), Max: 101.8 (05-16-21 @ 03:28)  HR: 106 (05-16-21 @ 08:09)  BP: 126/61 (05-16-21 @ 08:09) (126/61 - 142/69)  RR: 24 (05-16-21 @ 08:09)  SpO2: 94% (05-16-21 @ 08:09)    TESTS & MEASUREMENTS:  Weight (Kg): 113.2 (05-15-21 @ 01:11)  BMI (kg/m2): 34.8 (05-15)    05-15-21 @ 07:01  -  05-16-21 @ 07:00  --------------------------------------------------------  IN: 0 mL / OUT: 600 mL / NET: -600 mL                        14.2   10.53 )-----------( 293      ( 16 May 2021 08:57 )             43.8     PT/INR - ( 15 May 2021 08:37 )   PT: 15.80 sec;   INR: 1.37 ratio         PTT - ( 15 May 2021 08:37 )  PTT:28.4 sec  05-15    138  |  102  |  14  ----------------------------<  168<H>  3.9   |  22  |  0.8    Ca    8.8      15 May 2021 08:37  Mg     1.8     05-15    TPro  6.2  /  Alb  3.7  /  TBili  0.3  /  DBili  x   /  AST  19  /  ALT  22  /  AlkPhos  40  05-15    LIVER FUNCTIONS - ( 15 May 2021 08:37 )  Alb: 3.7 g/dL / Pro: 6.2 g/dL / ALK PHOS: 40 U/L / ALT: 22 U/L / AST: 19 U/L / GGT: x           CARDIAC MARKERS ( 15 May 2021 08:37 )  x     / <0.01 ng/mL / x     / x     / x        RECENT DIAGNOSTIC ORDERS:  Procalcitonin, Serum: AM Sched. Collection: 18-May-2021 04:30 (05-16-21 @ 10:07)  Procalcitonin, Serum: STAT  Addl Info: add on to am labs (05-16-21 @ 10:07)  Ferritin, Serum: STAT  Addl Info: add-on to am labs (05-16-21 @ 10:07)  C-Reactive Protein, Serum: STAT  Addl Info: add on to am labs (05-16-21 @ 10:07)  D-Dimer Assay, Quantitative:  Start Date:18-May-2021. AM Sched. Collection:18-May-2021 04:30 (05-16-21 @ 09:57)  Ferritin, Serum: AM Sched. Collection: 18-May-2021 04:30 (05-16-21 @ 09:57)  C-Reactive Protein, Serum: AM Sched. Collection: 18-May-2021 04:30 (05-16-21 @ 09:57)  Antibody Screen Interpretation: 08:57 (05-16-21 @ 09:40)  12 Lead ECG (05-16-21 @ 08:19)  Xray Chest 1 View-PORTABLE IMMEDIATE: IMMEDIATE   Indication: sob  Transport: Portable  Exam Completed (05-16-21 @ 03:28)  Blood Gas Arterial - Lytes,H+H,iCa,Lact: STAT (05-16-21 @ 03:28)  Xray Chest 1 View- PORTABLE-Routine: AM   Indication: follow up  Transport: Portable (05-15-21 @ 11:35)  Magnesium, Serum: AM Sched. Collection: 17-May-2021 04:30 (05-15-21 @ 11:35)  Comprehensive Metabolic Panel: AM Sched. Collection: 17-May-2021 04:30 (05-15-21 @ 11:35)  Complete Blood Count + Automated Diff: AM Sched. Collection: 17-May-2021 04:30 (05-15-21 @ 11:35)  Magnesium, Serum: AM Sched. Collection: 16-May-2021 04:30 (05-15-21 @ 11:35)  Comprehensive Metabolic Panel: AM Sched. Collection: 16-May-2021 04:30 (05-15-21 @ 11:35)  ABO Rh Confirmatory Specimen: AM  Addl Info: Conditional: ABO Rh Confirmatory Specimen (05-15-21 @ 11:34)  Type + Screen: AM  Sched. Collection:16-May-2021 04:30 (05-15-21 @ 11:34)    MEDICATIONS:  MEDICATIONS  (STANDING):  albuterol/ipratropium for Nebulization.. 3 milliLiter(s) Nebulizer every 20 minutes  budesonide  80 MICROgram(s)/formoterol 4.5 MICROgram(s) Inhaler 2 Puff(s) Inhalation two times a day  chlorhexidine 4% Liquid 1 Application(s) Topical <User Schedule>  dexAMETHasone  Injectable 6 milliGRAM(s) IV Push daily  enoxaparin Injectable 40 milliGRAM(s) SubCutaneous every 12 hours  guaifenesin/dextromethorphan Oral Liquid 10 milliLiter(s) Oral every 6 hours  pantoprazole    Tablet 40 milliGRAM(s) Oral before breakfast  remdesivir  IVPB   IV Intermittent   remdesivir  IVPB 100 milliGRAM(s) IV Intermittent every 24 hours  tocilizumab IVPB 800 milliGRAM(s) IV Intermittent once    MEDICATIONS  (PRN):  acetaminophen   Tablet .. 650 milliGRAM(s) Oral every 6 hours PRN Temp greater or equal to 38C (100.4F), Moderate Pain (4 - 6)  ALBUTerol  90 MICROgram(s) HFA Inhaler - Peds 2 Puff(s) Inhalation every 2 hours PRN Bronchospasm    HOME MEDICATIONS:  Albuterol (Eqv-ProAir HFA) 90 mcg/inh inhalation aerosol (05-14)    REVIEW OF SYSTEMS:  All other review of systems is negative unless indicated above.     PHYSICAL EXAM:  GENERAL: NAD  HEENT: No Swelling  CHEST/LUNG: Good air entry, No wheezing  HEART: RRR, No murmurs  ABDOMEN: Soft, Bowel sounds present  EXTREMITIES:  No clubbing      
  Patient Information:  KENYETTA CHEN / 25y / Male / MRN#:[id number]    Hospital Day: 7d    Interval History:      Past Medical History:  Asthma      Past Surgical History:    Allergies:  No Known Allergies    Medications:  PRN:  acetaminophen   Tablet .. 650 milliGRAM(s) Oral every 6 hours PRN Temp greater or equal to 38C (100.4F), Moderate Pain (4 - 6)  ALBUTerol  90 MICROgram(s) HFA Inhaler - Peds 2 Puff(s) Inhalation every 2 hours PRN Bronchospasm    Standing:  budesonide  80 MICROgram(s)/formoterol 4.5 MICROgram(s) Inhaler 2 Puff(s) Inhalation two times a day  chlorhexidine 4% Liquid 1 Application(s) Topical <User Schedule>  enoxaparin Injectable 40 milliGRAM(s) SubCutaneous every 12 hours  pantoprazole    Tablet 40 milliGRAM(s) Oral before breakfast    Home:  Albuterol (Eqv-ProAir HFA) 90 mcg/inh inhalation aerosol: 2 puff(s) inhaled every 6 hours    Vitals:  T(C): 35.8, Max: 36.8 (05-20-21 @ 21:01)  T(F): 96.5, Max: 98.2 (05-20-21 @ 21:01)  HR: 85 (66 - 88)  BP: 124/81 (99/59 - 124/81)  RR: 20 (18 - 27)  SpO2: 94% (93% - 98%)    Physical Exam:  General: NAD  Heart: RRR, no murmurs or rubs  Lungs: CTAB, no wheezing  Abdomen: Soft, nontender  Extremities: no edema or cyanosis  Neuro: AAOx3    Labs:  CBC (05-21 @ 07:41)                        Hgb: 15.6   WBC: 12.27 )-----------------( Plts: 368                              Hct: 47.7     Chem (05-21 @ 07:41)  Na: 139  |     Cl: 104     |  BUN: 24  -----------------------------------------< Gluc: 157    K: 5.2   |    HCO3: 23  |  Cr: 0.8    Ca 8.8 (05-21 @ 07:41)  Phos 4.1 (05-20 @ 12:17)  Mg 2.4 (05-20 @ 12:17)    LFTs (05-21 @ 07:41)  TPro 6.1  /  Alb 3.8  TBili 0.4  /  DBili     AST 20  /  ALT 62  /  AlkPhos 46            Microbiology:    Radiology:  
24H events:    Patient is a 25y old Male who presents with a chief complaint of Shortness of breath (17 May 2021 10:05)    Primary diagnosis of Acute respiratory failure with hypoxia       Today is hospital day 4d. This morning patient was seen and examined at bedside, resting comfortably in bed.    Desaturation overnight, requiring HFNC with NR  PAST MEDICAL & SURGICAL HISTORY  Asthma      SOCIAL HISTORY:  Social History:  non smoker, social etoh, works as . (14 May 2021 21:00)      ALLERGIES:  No Known Allergies    MEDICATIONS:  STANDING MEDICATIONS  albuterol/ipratropium for Nebulization.. 3 milliLiter(s) Nebulizer every 20 minutes  budesonide  80 MICROgram(s)/formoterol 4.5 MICROgram(s) Inhaler 2 Puff(s) Inhalation two times a day  chlorhexidine 4% Liquid 1 Application(s) Topical <User Schedule>  enoxaparin Injectable 40 milliGRAM(s) SubCutaneous every 12 hours  guaifenesin/dextromethorphan Oral Liquid 10 milliLiter(s) Oral every 6 hours  methylPREDNISolone sodium succinate Injectable 60 milliGRAM(s) IV Push every 8 hours  pantoprazole    Tablet 40 milliGRAM(s) Oral before breakfast  remdesivir  IVPB   IV Intermittent   remdesivir  IVPB 100 milliGRAM(s) IV Intermittent every 24 hours    PRN MEDICATIONS  acetaminophen   Tablet .. 650 milliGRAM(s) Oral every 6 hours PRN  ALBUTerol  90 MICROgram(s) HFA Inhaler - Peds 2 Puff(s) Inhalation every 2 hours PRN    VITALS:   T(F): 98.9  HR: 94  BP: 124/64  RR: 33  SpO2: 94%    PHYSICAL EXAM:  GENERAL: not in distress, on Non rebreather  HEAD:  Atraumatic, Normocephalic  EYES: EOMI  NECK: Supple  NERVOUS SYSTEM:  Alert & Oriented X3, non focal   CHEST/LUNG: Bilateral wheezes better  HEART: Regular rhythm; No murmurs, tachycardic  ABDOMEN: Soft, Nontender, Nondistended; Bowel sounds present  EXTREMITIES:  2+ Peripheral Pulses, No clubbing, cyanosis, or edema  LYMPH: No lymphadenopathy noted  SKIN: No rashes or lesions  LABS:                        15.6   11.75 )-----------( 352      ( 18 May 2021 04:45 )             47.1     05-18    138  |  101  |  22<H>  ----------------------------<  166<H>  4.7   |  23  |  0.7    Ca    8.9      18 May 2021 04:45  Mg     2.2     05-17    TPro  6.2  /  Alb  3.6  /  TBili  0.3  /  DBili  x   /  AST  26  /  ALT  58<H>  /  AlkPhos  49  05-18                  RADIOLOGY:          
Patient is a 25y old  Male who presents with a chief complaint of Shortness of breath (15 May 2021 11:29)        Over Night Events:  On 100% NRBM.  Some cough.          ROS:     All ROS are negative except HPI         PHYSICAL EXAM    ICU Vital Signs Last 24 Hrs  T(C): 37.4 (16 May 2021 05:38), Max: 38.8 (16 May 2021 03:28)  T(F): 99.4 (16 May 2021 05:38), Max: 101.8 (16 May 2021 03:28)  HR: 101 (16 May 2021 05:38) (95 - 109)  BP: 130/58 (16 May 2021 05:38) (130/58 - 142/69)  BP(mean): --  ABP: --  ABP(mean): --  RR: 23 (16 May 2021 05:38) (18 - 55)  SpO2: 96% (16 May 2021 05:38) (92% - 98%)      CONSTITUTIONAL:  Well nourished.  NAD    ENT:   Airway patent,   Mouth with normal mucosa.   No thrush    EYES:   Pupils equal,   Round and reactive to light.    CARDIAC:   Normal rate,   Regular rhythm.    No edema      Vascular:  Normal systolic impulse  No Carotid bruits    RESPIRATORY:   No wheezing  Bilateral crackles   Normal chest expansion  Not tachypneic,  No use of accessory muscles    GASTROINTESTINAL:  Abdomen soft,   Non-tender,   No guarding,   + BS    MUSCULOSKELETAL:   Range of motion is not limited,  No clubbing, cyanosis    NEUROLOGICAL:   Alert and oriented   No motor  deficits.    SKIN:   Skin normal color for race,   Warm and dry and intact.   No evidence of rash.    PSYCHIATRIC:   Normal mood and affect.   No apparent risk to self or others.    HEMATOLOGICAL:  No cervical  lymphadenopathy.  no inguinal lymphadenopathy      05-15-21 @ 07:01  -  05-16-21 @ 07:00  --------------------------------------------------------  IN:  Total IN: 0 mL    OUT:    Voided (mL): 600 mL  Total OUT: 600 mL    Total NET: -600 mL          LABS:                            13.7   11.10 )-----------( 233      ( 15 May 2021 08:37 )             40.7                                               05-15    138  |  102  |  14  ----------------------------<  168<H>  3.9   |  22  |  0.8    Ca    8.8      15 May 2021 08:37  Mg     1.8     05-15    TPro  6.2  /  Alb  3.7  /  TBili  0.3  /  DBili  x   /  AST  19  /  ALT  22  /  AlkPhos  40  05-15      PT/INR - ( 15 May 2021 08:37 )   PT: 15.80 sec;   INR: 1.37 ratio         PTT - ( 15 May 2021 08:37 )  PTT:28.4 sec                                           CARDIAC MARKERS ( 15 May 2021 08:37 )  x     / <0.01 ng/mL / x     / x     / x                                                LIVER FUNCTIONS - ( 15 May 2021 08:37 )  Alb: 3.7 g/dL / Pro: 6.2 g/dL / ALK PHOS: 40 U/L / ALT: 22 U/L / AST: 19 U/L / GGT: x                                                                                                                                   ABG - ( 16 May 2021 04:16 )  pH, Arterial: 7.43  pH, Blood: x     /  pCO2: 38    /  pO2: 59    / HCO3: 26    / Base Excess: 1.5   /  SaO2: 94                  MEDICATIONS  (STANDING):  albuterol/ipratropium for Nebulization. 3 milliLiter(s) Nebulizer once  albuterol/ipratropium for Nebulization.. 3 milliLiter(s) Nebulizer every 20 minutes  budesonide  80 MICROgram(s)/formoterol 4.5 MICROgram(s) Inhaler 2 Puff(s) Inhalation two times a day  chlorhexidine 4% Liquid 1 Application(s) Topical <User Schedule>  dexAMETHasone  Injectable 6 milliGRAM(s) IV Push daily  enoxaparin Injectable 40 milliGRAM(s) SubCutaneous daily  guaifenesin/dextromethorphan Oral Liquid 10 milliLiter(s) Oral every 6 hours  pantoprazole    Tablet 40 milliGRAM(s) Oral before breakfast  remdesivir  IVPB   IV Intermittent   remdesivir  IVPB 100 milliGRAM(s) IV Intermittent every 24 hours    MEDICATIONS  (PRN):  acetaminophen   Tablet .. 650 milliGRAM(s) Oral every 6 hours PRN Temp greater or equal to 38C (100.4F), Moderate Pain (4 - 6)  ALBUTerol  90 MICROgram(s) HFA Inhaler - Peds 2 Puff(s) Inhalation every 2 hours PRN Bronchospasm      New X-rays reviewed:                                                                                  ECHO    CXR interpreted by me:  Bilateral infiltrates     
KENYETTA CHEN  25y Male    CHIEF COMPLAINT:    Patient is a 25y old  Male who presents with a chief complaint of Shortness of breath (21 May 2021 14:10)      INTERVAL HPI/OVERNIGHT EVENTS:    Patient seen and examined. No acute events overnight. Feels well, eager to go home     ROS: All other systems are negative.    Vital Signs:    T(F): 97.2 (05-22-21 @ 08:00), Max: 97.9 (05-21-21 @ 11:45)  HR: 86 (05-22-21 @ 08:00) (69 - 94)  BP: 123/69 (05-22-21 @ 08:00) (115/67 - 129/74)  RR: 17 (05-22-21 @ 08:00) (17 - 20)  SpO2: 96% (05-22-21 @ 08:00) (94% - 97%)    21 May 2021 07:01  -  22 May 2021 07:00  --------------------------------------------------------  IN: 0 mL / OUT: 400 mL / NET: -400 mL    PHYSICAL EXAM:    GENERAL:  NAD  SKIN: No rashes or lesions  HEENT: Atraumatic. Normocephalic.   NECK: Supple, No JVD. No lymphadenopathy.  PULMONARY: CTA B/L. No wheezing. No rales  CVS: Normal S1, S2. Rate and Rhythm are regular.   ABDOMEN/GI: Soft, Nontender, Nondistended.  MSK:  No clubbing or cyanosis   NEUROLOGIC: moves all extremities  PSYCH: Alert & oriented x 3, normal affect    Consultant(s) Notes Reviewed:  [x ] YES  [ ] NO  Care Discussed with Consultants/Other Providers [ x] YES  [ ] NO    LABS:                        15.2   9.25  )-----------( 329      ( 22 May 2021 06:37 )             47.0     138  |  102  |  21<H>  ----------------------------<  108<H>  5.0   |  26  |  0.8    Ca    8.6      22 May 2021 06:37  Phos  4.1     05-20  Mg     2.4     05-22    TPro  6.1  /  Alb  3.8  /  TBili  0.4  /  DBili  x   /  AST  20  /  ALT  62<H>  /  AlkPhos  46  05-21    RADIOLOGY & ADDITIONAL TESTS:  Imaging or report Personally Reviewed:  [x] YES  [ ] NO  EKG reviewed: [x] YES  [ ] NO    Medications:  Standing  budesonide  80 MICROgram(s)/formoterol 4.5 MICROgram(s) Inhaler 2 Puff(s) Inhalation two times a day  chlorhexidine 4% Liquid 1 Application(s) Topical <User Schedule>  dexAMETHasone     Tablet 6 milliGRAM(s) Oral daily  enoxaparin Injectable 40 milliGRAM(s) SubCutaneous every 12 hours  pantoprazole    Tablet 40 milliGRAM(s) Oral before breakfast    PRN Meds  acetaminophen   Tablet .. 650 milliGRAM(s) Oral every 6 hours PRN  ALBUTerol  90 MICROgram(s) HFA Inhaler - Peds 2 Puff(s) Inhalation every 2 hours PRN            
Patient is a 25y old  Male who presents with a chief complaint of Shortness of breath (20 May 2021 09:12)        Over Night Events:  Feels better.  On NC.  No SOB at rest.  CARDOZO         ROS:     All ROS are negative except HPI         PHYSICAL EXAM    ICU Vital Signs Last 24 Hrs  T(C): 35.8 (21 May 2021 04:50), Max: 36.8 (20 May 2021 21:01)  T(F): 96.4 (21 May 2021 04:50), Max: 98.2 (20 May 2021 21:01)  HR: 77 (21 May 2021 04:50) (66 - 88)  BP: 113/71 (21 May 2021 04:50) (95/65 - 113/71)  BP(mean): 71 (20 May 2021 16:00) (71 - 84)  ABP: --  ABP(mean): --  RR: 20 (21 May 2021 04:50) (18 - 27)  SpO2: 97% (21 May 2021 04:50) (93% - 98%)      CONSTITUTIONAL:  Well nourished.  NAD    ENT:   Airway patent,   Mouth with normal mucosa.   No thrush    EYES:   Pupils equal,   Round and reactive to light.    CARDIAC:   Normal rate,   Regular rhythm.    No edema      Vascular:  Normal systolic impulse  No Carotid bruits    RESPIRATORY:   No wheezing  Bilateral BS  Normal chest expansion  Not tachypneic,  No use of accessory muscles    GASTROINTESTINAL:  Abdomen soft,   Non-tender,   No guarding,   + BS    MUSCULOSKELETAL:   Range of motion is not limited,  No clubbing, cyanosis    NEUROLOGICAL:   Alert and oriented   No motor  deficits.    SKIN:   Skin normal color for race,   Warm and dry and intact.   No evidence of rash.    PSYCHIATRIC:   Normal mood and affect.   No apparent risk to self or others.    HEMATOLOGICAL:  No cervical  lymphadenopathy.  no inguinal lymphadenopathy      05-20-21 @ 07:01  -  05-21-21 @ 07:00  --------------------------------------------------------  IN:    Oral Fluid: 220 mL  Total IN: 220 mL    OUT:    Voided (mL): 1600 mL  Total OUT: 1600 mL    Total NET: -1380 mL          LABS:                            16.3   8.76  )-----------( 385      ( 20 May 2021 12:17 )             50.4                                               05-20    140  |  104  |  33<H>  ----------------------------<  107<H>  4.3   |  21  |  0.9    Ca    8.7      20 May 2021 12:17  Phos  4.1     05-20  Mg     2.4     05-20    TPro  6.4  /  Alb  3.8  /  TBili  0.5  /  DBili  x   /  AST  25  /  ALT  72<H>  /  AlkPhos  46  05-20                                                                                           LIVER FUNCTIONS - ( 20 May 2021 12:17 )  Alb: 3.8 g/dL / Pro: 6.4 g/dL / ALK PHOS: 46 U/L / ALT: 72 U/L / AST: 25 U/L / GGT: x                                                                                                                                       MEDICATIONS  (STANDING):  budesonide  80 MICROgram(s)/formoterol 4.5 MICROgram(s) Inhaler 2 Puff(s) Inhalation two times a day  chlorhexidine 4% Liquid 1 Application(s) Topical <User Schedule>  enoxaparin Injectable 40 milliGRAM(s) SubCutaneous every 12 hours  methylPREDNISolone sodium succinate Injectable 60 milliGRAM(s) IV Push every 12 hours  pantoprazole    Tablet 40 milliGRAM(s) Oral before breakfast    MEDICATIONS  (PRN):  acetaminophen   Tablet .. 650 milliGRAM(s) Oral every 6 hours PRN Temp greater or equal to 38C (100.4F), Moderate Pain (4 - 6)  ALBUTerol  90 MICROgram(s) HFA Inhaler - Peds 2 Puff(s) Inhalation every 2 hours PRN Bronchospasm      New X-rays reviewed:                                                                                  ECHO    CXR interpreted by me:  Improved infiltrates     
    OVERNIGHT EVENTS: events noted, on 100% NRM, do not want HHFNC    Vital Signs Last 24 Hrs  T(C): 36.9 (17 May 2021 08:00), Max: 37.3 (16 May 2021 18:00)  T(F): 98.4 (17 May 2021 08:00), Max: 99.1 (16 May 2021 18:00)  HR: 90 (17 May 2021 09:00) (74 - 94)  BP: 118/65 (17 May 2021 09:00) (104/69 - 130/73)  BP(mean): 86 (17 May 2021 09:00) (78 - 92)  RR: 43 (17 May 2021 09:00) (9 - 49)  SpO2: 97% (17 May 2021 10:04) (92% - 99%)    PHYSICAL EXAMINATION:    GENERAL: ILL LOOKING.     HEENT: Head is normocephalic and atraumatic.     NECK: Supple.    LUNGS: bl crackles    HEART: Regular rate and rhythm without murmur.    ABDOMEN: Soft, nontender, and nondistended.      EXTREMITIES: Without any cyanosis, clubbing, rash, lesions or edema.    NEUROLOGIC: Grossly intact.    SKIN: No ulceration or induration present.      LABS:                        13.9   5.93  )-----------( 305      ( 17 May 2021 04:18 )             43.3     05-17    139  |  101  |  18  ----------------------------<  127<H>  4.2   |  21  |  0.9    Ca    8.0<L>      17 May 2021 04:18  Mg     2.2     05-17    TPro  5.9<L>  /  Alb  3.4<L>  /  TBili  0.4  /  DBili  x   /  AST  22  /  ALT  28  /  AlkPhos  41  05-17        ABG - ( 16 May 2021 04:16 )  pH, Arterial: 7.43  pH, Blood: x     /  pCO2: 38    /  pO2: 59    / HCO3: 26    / Base Excess: 1.5   /  SaO2: 94                      Serum Pro-Brain Natriuretic Peptide: 42 pg/mL (05-14-21 @ 18:10)      Procalcitonin, Serum: 0.12 ng/mL (05-15-21 @ 08:37)  Procalcitonin, Serum: 0.13 ng/mL (05-14-21 @ 16:45)        05-16-21 @ 07:01  -  05-17-21 @ 07:00  --------------------------------------------------------  IN: 1810 mL / OUT: 2350 mL / NET: -540 mL        MICROBIOLOGY:      MEDICATIONS  (STANDING):  albuterol/ipratropium for Nebulization.. 3 milliLiter(s) Nebulizer every 20 minutes  budesonide  80 MICROgram(s)/formoterol 4.5 MICROgram(s) Inhaler 2 Puff(s) Inhalation two times a day  chlorhexidine 4% Liquid 1 Application(s) Topical <User Schedule>  dexAMETHasone  Injectable 6 milliGRAM(s) IV Push daily  enoxaparin Injectable 40 milliGRAM(s) SubCutaneous every 12 hours  guaifenesin/dextromethorphan Oral Liquid 10 milliLiter(s) Oral every 6 hours  pantoprazole    Tablet 40 milliGRAM(s) Oral before breakfast  remdesivir  IVPB   IV Intermittent   remdesivir  IVPB 100 milliGRAM(s) IV Intermittent every 24 hours    MEDICATIONS  (PRN):  acetaminophen   Tablet .. 650 milliGRAM(s) Oral every 6 hours PRN Temp greater or equal to 38C (100.4F), Moderate Pain (4 - 6)  ALBUTerol  90 MICROgram(s) HFA Inhaler - Peds 2 Puff(s) Inhalation every 2 hours PRN Bronchospasm      RADIOLOGY & ADDITIONAL STUDIES:    
SUBJECTIVE:    Patient is a 25y old Male who presents with a chief complaint of Shortness of breath (15 May 2021 08:53)    Currently admitted to medicine with the primary diagnosis of COVID-19    Today is hospital day 1d. overnight patient desaturated and switched to NRB at 10L.    Admit Diagnosis:  COVID 19,DYSPENIA,ASTHMA        PAST MEDICAL & SURGICAL HISTORY  Asthma    Asthma        SOCIAL HISTORY:  Negative for smoking/alcohol/drug use.     ALLERGIES:  No Known Allergies    MEDICATIONS:  STANDING MEDICATIONS  albuterol/ipratropium for Nebulization.. 3 milliLiter(s) Nebulizer every 20 minutes  budesonide  80 MICROgram(s)/formoterol 4.5 MICROgram(s) Inhaler 2 Puff(s) Inhalation two times a day  chlorhexidine 4% Liquid 1 Application(s) Topical <User Schedule>  dexAMETHasone  Injectable 6 milliGRAM(s) IV Push daily  enoxaparin Injectable 40 milliGRAM(s) SubCutaneous daily  guaifenesin/dextromethorphan Oral Liquid 10 milliLiter(s) Oral every 6 hours  pantoprazole    Tablet 40 milliGRAM(s) Oral before breakfast  remdesivir  IVPB   IV Intermittent     PRN MEDICATIONS  acetaminophen   Tablet .. 650 milliGRAM(s) Oral every 6 hours PRN  ALBUTerol  90 MICROgram(s) HFA Inhaler - Peds 2 Puff(s) Inhalation every 2 hours PRN    VITALS:   T(F): 98.7, Max: 102.2 (05-15-21 @ 01:11)  HR: 107 (106 - 127)  BP: 146/67 (121/60 - 146/67)  RR: 20 (20 - 22)  SpO2: 94% (90% - 96%)    I&Os:    PHYSICAL EXAM:  GEN: No acute distress  LUNGS: Clear to auscultation bilaterally, on NRB  HEART: S1/S2  ABD: Soft, NT/ND. BS +  EXT: no cyanosis/edema  NEURO: AAOX3    LABS:                        13.7   11.10 )-----------( 233      ( 15 May 2021 08:37 )             40.7     05-15    138  |  102  |  14  ----------------------------<  168<H>  3.9   |  22  |  0.8    Ca    8.8      15 May 2021 08:37  Mg     1.8     05-15    TPro  6.2  /  Alb  3.7  /  TBili  0.3  /  DBili  x   /  AST  19  /  ALT  22  /  AlkPhos  40  05-15    Creatinine trend: 0.8<--, 1.0<--  PT/INR - ( 15 May 2021 08:37 )   PT: 15.80 sec;   INR: 1.37 ratio         PTT - ( 15 May 2021 08:37 )  PTT:28.4 sec      CARDIAC MARKERS ( 15 May 2021 08:37 )  x     / <0.01 ng/mL / x     / x     / x          COVID-19 PCR: Detected (05-14-21 @ 19:24)    Ferritin, Serum: 508 ng/mL (05-14-21 @ 16:45)    D-Dimer Assay, Quantitative: 222 ng/mL DDU (05-15-21 @ 08:37)  D-Dimer Assay, Quantitative: 144 ng/mL DDU (05-14-21 @ 18:10)    C-Reactive Protein, Serum: 80 mg/L (05-14-21 @ 16:45)    Procalcitonin, Serum: 0.13 ng/mL (05-14-21 @ 16:45)        RADIOLOGY:  < from: Xray Chest 1 View- PORTABLE-Urgent (Xray Chest 1 View- PORTABLE-Urgent .) (05.14.21 @ 15:30) >  Bilateral opacities    < end of copied text >  
    OVERNIGHT EVENTS: events noted, sill on HHFNC/ NRM, afebrile    Vital Signs Last 24 Hrs  T(C): 36.6 (19 May 2021 08:00), Max: 37.3 (18 May 2021 12:00)  T(F): 97.9 (19 May 2021 08:00), Max: 99.1 (18 May 2021 12:00)  HR: 72 (19 May 2021 09:00) (66 - 108)  BP: 118/59 (19 May 2021 09:00) (85/48 - 133/67)  BP(mean): 82 (19 May 2021 09:00) (64 - 98)  RR: 21 (19 May 2021 09:00) (19 - 35)  SpO2: 98% (19 May 2021 09:00) (91% - 98%)    PHYSICAL EXAMINATION:    GENERAL: ill looking    HEENT: Head is normocephalic and atraumatic. Extraocular muscles are intact.    NECK: Supple.    LUNGS: bl crackles    HEART: Regular rate and rhythm without murmur.    ABDOMEN: Soft, nontender, and nondistended.      EXTREMITIES: Without any cyanosis, clubbing, rash, lesions or edema.    NEUROLOGIC: Grossly intact.    SKIN: No ulceration or induration present.      LABS:                        15.7   16.90 )-----------( 436      ( 19 May 2021 05:00 )             47.4     05-19    138  |  103  |  30<H>  ----------------------------<  189<H>  4.8   |  23  |  1.0    Ca    9.0      19 May 2021 05:00  Phos  5.3     05-19  Mg     2.5     05-19    TPro  6.3  /  Alb  3.7  /  TBili  0.4  /  DBili  x   /  AST  25  /  ALT  64<H>  /  AlkPhos  49  05-19              D-Dimer Assay, Quantitative: 93 ng/mL DDU (05-19-21 @ 05:00)        Procalcitonin, Serum: 0.04 ng/mL (05-18-21 @ 04:45)  Procalcitonin, Serum: 0.11 ng/mL (05-16-21 @ 10:46)        05-18-21 @ 07:01  -  05-19-21 @ 07:00  --------------------------------------------------------  IN: 250 mL / OUT: 3550 mL / NET: -3300 mL    05-19-21 @ 07:01  - 05-19-21 @ 09:41  --------------------------------------------------------  IN: 0 mL / OUT: 350 mL / NET: -350 mL        MICROBIOLOGY:      MEDICATIONS  (STANDING):  albuterol/ipratropium for Nebulization.. 3 milliLiter(s) Nebulizer every 20 minutes  budesonide  80 MICROgram(s)/formoterol 4.5 MICROgram(s) Inhaler 2 Puff(s) Inhalation two times a day  chlorhexidine 4% Liquid 1 Application(s) Topical <User Schedule>  enoxaparin Injectable 40 milliGRAM(s) SubCutaneous every 12 hours  guaifenesin/dextromethorphan Oral Liquid 10 milliLiter(s) Oral every 6 hours  methylPREDNISolone sodium succinate Injectable 60 milliGRAM(s) IV Push every 8 hours  pantoprazole    Tablet 40 milliGRAM(s) Oral before breakfast    MEDICATIONS  (PRN):  acetaminophen   Tablet .. 650 milliGRAM(s) Oral every 6 hours PRN Temp greater or equal to 38C (100.4F), Moderate Pain (4 - 6)  ALBUTerol  90 MICROgram(s) HFA Inhaler - Peds 2 Puff(s) Inhalation every 2 hours PRN Bronchospasm      RADIOLOGY & ADDITIONAL STUDIES:    
24H events:    Patient is a 25y old Male who presents with a chief complaint of Shortness of breath (17 May 2021 10:05)    Primary diagnosis of Acute respiratory failure with hypoxia       Today is hospital day 3d. This morning patient was seen and examined at bedside, resting comfortably in bed.    No events overnight. Patient refused NIV overnight.    PAST MEDICAL & SURGICAL HISTORY  Asthma      SOCIAL HISTORY:  Social History:  non smoker, social EtOH works as . (14 May 2021 21:00)      ALLERGIES:  No Known Allergies    MEDICATIONS:  STANDING MEDICATIONS  albuterol/ipratropium for Nebulization.. 3 milliLiter(s) Nebulizer every 20 minutes  budesonide  80 MICROgram(s)/formoterol 4.5 MICROgram(s) Inhaler 2 Puff(s) Inhalation two times a day  chlorhexidine 4% Liquid 1 Application(s) Topical <User Schedule>  enoxaparin Injectable 40 milliGRAM(s) SubCutaneous every 12 hours  guaifenesin/dextromethorphan Oral Liquid 10 milliLiter(s) Oral every 6 hours  methylPREDNISolone sodium succinate Injectable 60 milliGRAM(s) IV Push every 8 hours  pantoprazole    Tablet 40 milliGRAM(s) Oral before breakfast  remdesivir  IVPB   IV Intermittent   remdesivir  IVPB 100 milliGRAM(s) IV Intermittent every 24 hours    PRN MEDICATIONS  acetaminophen   Tablet .. 650 milliGRAM(s) Oral every 6 hours PRN  ALBUTerol  90 MICROgram(s) HFA Inhaler - Peds 2 Puff(s) Inhalation every 2 hours PRN    VITALS:   T(F): 98.7  HR: 92  BP: 116/56  RR: 30  SpO2: 95%    PHYSICAL EXAM:  GENERAL: not in distress, on Non rebreather  HEAD:  Atraumatic, Normocephalic  EYES: EOMI  NECK: Supple  NERVOUS SYSTEM:  Alert & Oriented X3, non focal   CHEST/LUNG: Bilateral wheezes   HEART: Regular rhythm; No murmurs, tachycardic  ABDOMEN: Soft, Nontender, Nondistended; Bowel sounds present  EXTREMITIES:  2+ Peripheral Pulses, No clubbing, cyanosis, or edema  LYMPH: No lymphadenopathy noted  SKIN: No rashes or lesions  LABS:                        13.9   5.93  )-----------( 305      ( 17 May 2021 04:18 )             43.3     05-17    139  |  101  |  18  ----------------------------<  127<H>  4.2   |  21  |  0.9    Ca    8.0<L>      17 May 2021 04:18  Mg     2.2     05-17    TPro  5.9<L>  /  Alb  3.4<L>  /  TBili  0.4  /  DBili  x   /  AST  22  /  ALT  28  /  AlkPhos  41  05-17        ABG - ( 16 May 2021 04:16 )  pH, Arterial: 7.43  pH, Blood: x     /  pCO2: 38    /  pO2: 59    / HCO3: 26    / Base Excess: 1.5   /  SaO2: 94                        RADIOLOGY:      Chest Xray: Stable bilateral opacities  
24H events:    Patient is a 25y old Male who presents with a chief complaint of Shortness of breath (19 May 2021 09:40)    Primary diagnosis of Acute respiratory failure with hypoxia    Today is hospital day 5d. This morning patient was seen and examined at bedside, resting comfortably in bed.    No events over night  PAST MEDICAL & SURGICAL HISTORY  Asthma      SOCIAL HISTORY:  Social History:  non smoker, social etoh, works as . (14 May 2021 21:00)      ALLERGIES:  No Known Allergies    MEDICATIONS:  STANDING MEDICATIONS  albuterol/ipratropium for Nebulization.. 3 milliLiter(s) Nebulizer every 20 minutes  budesonide  80 MICROgram(s)/formoterol 4.5 MICROgram(s) Inhaler 2 Puff(s) Inhalation two times a day  chlorhexidine 4% Liquid 1 Application(s) Topical <User Schedule>  enoxaparin Injectable 40 milliGRAM(s) SubCutaneous every 12 hours  guaifenesin/dextromethorphan Oral Liquid 10 milliLiter(s) Oral every 6 hours  methylPREDNISolone sodium succinate Injectable 60 milliGRAM(s) IV Push every 12 hours  pantoprazole    Tablet 40 milliGRAM(s) Oral before breakfast    PRN MEDICATIONS  acetaminophen   Tablet .. 650 milliGRAM(s) Oral every 6 hours PRN  ALBUTerol  90 MICROgram(s) HFA Inhaler - Peds 2 Puff(s) Inhalation every 2 hours PRN    VITALS:   T(F): 98.2  HR: 94  BP: 121/70  RR: 14  SpO2: 93%    PHYSICAL EXAM:  GENERAL: not in distress, on Non rebreather  HEAD:  Atraumatic, Normocephalic  EYES: EOMI  NECK: Supple  NERVOUS SYSTEM:  Alert & Oriented X3, non focal   CHEST/LUNG: Bilateral wheezes better  HEART: Regular rhythm; No murmurs, tachycardic  ABDOMEN: Soft, Nontender, Nondistended; Bowel sounds present  EXTREMITIES:  2+ Peripheral Pulses, No clubbing, cyanosis, or edema  LYMPH: No lymphadenopathy noted  SKIN: No rashes or lesions  LABS:                        15.7   16.90 )-----------( 436      ( 19 May 2021 05:00 )             47.4     05-19    138  |  103  |  30<H>  ----------------------------<  189<H>  4.8   |  23  |  1.0    Ca    9.0      19 May 2021 05:00  Phos  5.3     05-19  Mg     2.5     05-19    TPro  6.3  /  Alb  3.7  /  TBili  0.4  /  DBili  x   /  AST  25  /  ALT  64<H>  /  AlkPhos  49  05-19                  RADIOLOGY:          
KENYETTA CHEN  25y Male    CHIEF COMPLAINT:    Patient is a 25y old  Male who presents with a chief complaint of Shortness of breath (21 May 2021 07:08)      INTERVAL HPI/OVERNIGHT EVENTS:    Patient seen and examined. No acute events overnight. downgraded from ICU, feels well, on NC    ROS: All other systems are negative.    Vital Signs:    T(F): 96.5 (05-21-21 @ 07:45), Max: 98.2 (05-20-21 @ 21:01)  HR: 85 (05-21-21 @ 07:45) (66 - 88)  BP: 124/81 (05-21-21 @ 07:45) (99/59 - 124/81)  RR: 20 (05-21-21 @ 09:57) (18 - 27)  SpO2: 94% (05-21-21 @ 09:57) (93% - 98%)    20 May 2021 07:01  -  21 May 2021 07:00  --------------------------------------------------------  IN: 220 mL / OUT: 1600 mL / NET: -1380 mL    PHYSICAL EXAM:    GENERAL:  NAD  SKIN: No rashes or lesions  HEENT: Atraumatic. Normocephalic.   NECK: Supple, No JVD.   PULMONARY: Coarse breath sounds b/l. No wheezing. No rales  CVS: Normal S1, S2. Rate and Rhythm are regular.   ABDOMEN/GI: Soft, Nontender, Nondistended; BS present  MSK:  No clubbing or cyanosis  NEUROLOGIC:  moves all extremities  PSYCH: Alert & oriented x 3, normal affect    Consultant(s) Notes Reviewed:  [x ] YES  [ ] NO  Care Discussed with Consultants/Other Providers [ x] YES  [ ] NO    LABS:                        15.6   12.27 )-----------( 368      ( 21 May 2021 07:41 )             47.7     139  |  104  |  24<H>  ----------------------------<  157<H>  5.2<H>   |  23  |  0.8    Ca    8.8      21 May 2021 07:41  Phos  4.1     05-20  Mg     2.4     05-20    TPro  6.1  /  Alb  3.8  /  TBili  0.4  /  DBili  x   /  AST  20  /  ALT  62<H>  /  AlkPhos  46  05-21    Serum Pro-Brain Natriuretic Peptide: 42 pg/mL (05-14-21 @ 18:10)    RADIOLOGY & ADDITIONAL TESTS:    Imaging or report Personally Reviewed:  [x] YES  [ ] NO  EKG reviewed: [x] YES  [ ] NO    Medications:  Standing  budesonide  80 MICROgram(s)/formoterol 4.5 MICROgram(s) Inhaler 2 Puff(s) Inhalation two times a day  chlorhexidine 4% Liquid 1 Application(s) Topical <User Schedule>  enoxaparin Injectable 40 milliGRAM(s) SubCutaneous every 12 hours  methylPREDNISolone sodium succinate Injectable 60 milliGRAM(s) IV Push every 12 hours  pantoprazole    Tablet 40 milliGRAM(s) Oral before breakfast    PRN Meds  acetaminophen   Tablet .. 650 milliGRAM(s) Oral every 6 hours PRN  ALBUTerol  90 MICROgram(s) HFA Inhaler - Peds 2 Puff(s) Inhalation every 2 hours PRN            
  KENYETTA CHEN  25y, Male    All available historical data reviewed    OVERNIGHT EVENTS:  n94% NC 4 LIT  no fevers  feels well and has no complaints     ROS:  General: Denies rigors, nightsweats  HEENT: Denies headache, rhinorrhea, sore throat, eye pain  CV: Denies CP, palpitations  PULM: Denies wheezing, hemoptysis  GI: Denies hematemesis, hematochezia, melena  : Denies discharge, hematuria  MSK: Denies arthralgias, myalgias  SKIN: Denies rash, lesions  NEURO: Denies paresthesias, weakness  PSYCH: Denies depression, anxiety    VITALS:  T(F): 96.5, Max: 98.2 (05-20-21 @ 21:01)  HR: 85  BP: 124/81  RR: 20Vital Signs Last 24 Hrs  T(C): 35.8 (21 May 2021 07:45), Max: 36.8 (20 May 2021 21:01)  T(F): 96.5 (21 May 2021 07:45), Max: 98.2 (20 May 2021 21:01)  HR: 85 (21 May 2021 07:45) (66 - 88)  BP: 124/81 (21 May 2021 07:45) (99/59 - 124/81)  BP(mean): 71 (20 May 2021 16:00) (71 - 81)  RR: 20 (21 May 2021 09:57) (18 - 21)  SpO2: 94% (21 May 2021 09:57) (93% - 98%)    TESTS & MEASUREMENTS:                        15.6   12.27 )-----------( 368      ( 21 May 2021 07:41 )             47.7     05-21    139  |  104  |  24<H>  ----------------------------<  157<H>  5.2<H>   |  23  |  0.8    Ca    8.8      21 May 2021 07:41  Phos  4.1     05-20  Mg     2.4     05-20    TPro  6.1  /  Alb  3.8  /  TBili  0.4  /  DBili  x   /  AST  20  /  ALT  62<H>  /  AlkPhos  46  05-21    LIVER FUNCTIONS - ( 21 May 2021 07:41 )  Alb: 3.8 g/dL / Pro: 6.1 g/dL / ALK PHOS: 46 U/L / ALT: 62 U/L / AST: 20 U/L / GGT: x                   RADIOLOGY & ADDITIONAL TESTS:  Personal review of radiological diagnostics performed  Echo and EKG results noted when applicable.     MEDICATIONS:  acetaminophen   Tablet .. 650 milliGRAM(s) Oral every 6 hours PRN  ALBUTerol  90 MICROgram(s) HFA Inhaler - Peds 2 Puff(s) Inhalation every 2 hours PRN  budesonide  80 MICROgram(s)/formoterol 4.5 MICROgram(s) Inhaler 2 Puff(s) Inhalation two times a day  chlorhexidine 4% Liquid 1 Application(s) Topical <User Schedule>  enoxaparin Injectable 40 milliGRAM(s) SubCutaneous every 12 hours  pantoprazole    Tablet 40 milliGRAM(s) Oral before breakfast      ANTIBIOTICS:    
  KENYETTA CHEN  25y, Male    All available historical data reviewed    OVERNIGHT EVENTS:  HFNC  feels well and has no complaints     ROS:  General: Denies rigors, nightsweats  HEENT: Denies headache, rhinorrhea, sore throat, eye pain  CV: Denies CP, palpitations  PULM: Denies wheezing, hemoptysis  GI: Denies hematemesis, hematochezia, melena  : Denies discharge, hematuria  MSK: Denies arthralgias, myalgias  SKIN: Denies rash, lesions  NEURO: Denies paresthesias, weakness  PSYCH: Denies depression, anxiety    VITALS:  T(F): 98.1, Max: 98.2 (05-19-21 @ 12:00)  HR: 70  BP: 103/60  RR: 17Vital Signs Last 24 Hrs  T(C): 36.7 (19 May 2021 20:00), Max: 36.8 (19 May 2021 12:00)  T(F): 98.1 (19 May 2021 20:00), Max: 98.2 (19 May 2021 12:00)  HR: 70 (20 May 2021 06:00) (58 - 94)  BP: 103/60 (20 May 2021 06:00) (102/57 - 126/70)  BP(mean): 86 (20 May 2021 06:00) (75 - 92)  RR: 17 (20 May 2021 06:00) (0 - 44)  SpO2: 98% (20 May 2021 06:00) (93% - 100%)    TESTS & MEASUREMENTS:                        15.7   16.90 )-----------( 436      ( 19 May 2021 05:00 )             47.4     05-19    138  |  103  |  30<H>  ----------------------------<  189<H>  4.8   |  23  |  1.0    Ca    9.0      19 May 2021 05:00  Phos  5.3     05-19  Mg     2.5     05-19    TPro  6.3  /  Alb  3.7  /  TBili  0.4  /  DBili  x   /  AST  25  /  ALT  64<H>  /  AlkPhos  49  05-19    LIVER FUNCTIONS - ( 19 May 2021 05:00 )  Alb: 3.7 g/dL / Pro: 6.3 g/dL / ALK PHOS: 49 U/L / ALT: 64 U/L / AST: 25 U/L / GGT: x                   RADIOLOGY & ADDITIONAL TESTS:  Personal review of radiological diagnostics performed  Echo and EKG results noted when applicable.     MEDICATIONS:  acetaminophen   Tablet .. 650 milliGRAM(s) Oral every 6 hours PRN  ALBUTerol  90 MICROgram(s) HFA Inhaler - Peds 2 Puff(s) Inhalation every 2 hours PRN  albuterol/ipratropium for Nebulization.. 3 milliLiter(s) Nebulizer every 20 minutes  budesonide  80 MICROgram(s)/formoterol 4.5 MICROgram(s) Inhaler 2 Puff(s) Inhalation two times a day  chlorhexidine 4% Liquid 1 Application(s) Topical <User Schedule>  enoxaparin Injectable 40 milliGRAM(s) SubCutaneous every 12 hours  methylPREDNISolone sodium succinate Injectable 60 milliGRAM(s) IV Push every 12 hours  pantoprazole    Tablet 40 milliGRAM(s) Oral before breakfast      ANTIBIOTICS:    
  KENYETTA CHEN  25y, Male    All available historical data reviewed    OVERNIGHT EVENTS:  progressive SOB  NRB    ROS:  General: Denies rigors, nightsweats  HEENT: Denies headache, rhinorrhea, sore throat, eye pain  CV: Denies CP, palpitations  PULM: Denies wheezing, hemoptysis  GI: Denies hematemesis, hematochezia, melena  : Denies discharge, hematuria  MSK: Denies arthralgias, myalgias  SKIN: Denies rash, lesions  NEURO: Denies paresthesias, weakness  PSYCH: Denies depression, anxiety    VITALS:  T(F): 101.1, Max: 101.8 (05-16-21 @ 03:28)  HR: 106  BP: 126/61  RR: 24Vital Signs Last 24 Hrs  T(C): 38.4 (16 May 2021 08:09), Max: 38.8 (16 May 2021 03:28)  T(F): 101.1 (16 May 2021 08:09), Max: 101.8 (16 May 2021 03:28)  HR: 106 (16 May 2021 08:09) (95 - 109)  BP: 126/61 (16 May 2021 08:09) (126/61 - 142/69)  BP(mean): --  RR: 24 (16 May 2021 08:09) (18 - 55)  SpO2: 94% (16 May 2021 08:09) (92% - 98%)    TESTS & MEASUREMENTS:                        14.2   10.53 )-----------( 293      ( 16 May 2021 08:57 )             43.8     05-16    138  |  101  |  18  ----------------------------<  138<H>  4.3   |  24  |  0.7    Ca    8.4<L>      16 May 2021 08:57  Mg     1.9     05-16    TPro  5.9<L>  /  Alb  3.3<L>  /  TBili  0.4  /  DBili  x   /  AST  24  /  ALT  29  /  AlkPhos  40  05-16    LIVER FUNCTIONS - ( 16 May 2021 08:57 )  Alb: 3.3 g/dL / Pro: 5.9 g/dL / ALK PHOS: 40 U/L / ALT: 29 U/L / AST: 24 U/L / GGT: x                   RADIOLOGY & ADDITIONAL TESTS:  Personal review of radiological diagnostics performed  Echo and EKG results noted when applicable.     MEDICATIONS:  acetaminophen   Tablet .. 650 milliGRAM(s) Oral every 6 hours PRN  ALBUTerol  90 MICROgram(s) HFA Inhaler - Peds 2 Puff(s) Inhalation every 2 hours PRN  albuterol/ipratropium for Nebulization.. 3 milliLiter(s) Nebulizer every 20 minutes  budesonide  80 MICROgram(s)/formoterol 4.5 MICROgram(s) Inhaler 2 Puff(s) Inhalation two times a day  chlorhexidine 4% Liquid 1 Application(s) Topical <User Schedule>  dexAMETHasone  Injectable 6 milliGRAM(s) IV Push daily  enoxaparin Injectable 40 milliGRAM(s) SubCutaneous every 12 hours  guaifenesin/dextromethorphan Oral Liquid 10 milliLiter(s) Oral every 6 hours  pantoprazole    Tablet 40 milliGRAM(s) Oral before breakfast  remdesivir  IVPB   IV Intermittent   remdesivir  IVPB 100 milliGRAM(s) IV Intermittent every 24 hours  tocilizumab IVPB 800 milliGRAM(s) IV Intermittent once      ANTIBIOTICS:  remdesivir  IVPB   IV Intermittent   remdesivir  IVPB 100 milliGRAM(s) IV Intermittent every 24 hours

## 2021-05-22 NOTE — DISCHARGE NOTE NURSING/CASE MANAGEMENT/SOCIAL WORK - PATIENT PORTAL LINK FT
You can access the FollowMyHealth Patient Portal offered by [facility] by registering at the following website: [email or web address]. By joining [employer]’s FollowMyHealth portal, you will also be able to view your health information using other applications (apps) compatible with our system.

## 2021-05-22 NOTE — CHART NOTE - NSCHARTNOTEFT_GEN_A_CORE
25 years old male with a PMH of asthma, on day 9 of Covid symptoms presents today for eval of worsening SOB for past 4 days. Patient was placed on 6L NC in ED, overnight patient desaturated and was switched to 10L NRB. This morning saturating 94% on NRB. was placed on RDV and dexamethasone. Patient with worsening CXR and continues to be on NRB saturating low 90s. Patient was upgraded to ICU 5/15 for increased O2 requirement.    ICU Course:  Patient was admitted to the ICU for increase in O2 requirement. Was initially on NR then required HFNC initially at 100% 40L. Patient's was improving clinically, currently on 60% 40L. Was shifted from from Dexamethasone to solumedrol given wheezing and history of asthma with solumedrol tapered to 60 mg IV q 12h 5/19. Inflammatory markers improving. Chest X-ray showing stable bilateral opacities.    25 years old male with a pmhx of asthma, on day 9 of Covid symptoms presents today for eval of worsening SOB for past 4 days. Patient was placed on 6L NC in ED, overnight of admission patient desaturated and was switched to 10L NRB.    Impression:  Severe Covid PNA  Acute Asthma exacerbation    # SOB / moderate - severe COVID - 19 PNA  # Acute asthma exacerbation  - CXR shows b/l pulmonary opacities worsening  - repeat D-Dimer 222-170-94, ferritin 508-656, CRP 80-40, Procal 0.13-0.12-0.04 noted.  - now on NRB and HFNC 40L 60% saturating 94%  - monitor O2, titrate O2 as needed to goal of 92 - 96%  - c/w  solumedrol 60 mg iv q 12h  - Remdesivir 200mg IV x 1 day followed by 100mg IV x 4 days  - anticoagulation: Lovenox 40 bid   - tylenols for fever  - seen by ID, 800mg toci x1 5/16/21. COVID antibody +  - c/w inhalers  - trend D-Dimer, CRP, ferritin q48hrs  - Duplex venous negative 5/18  - s/p LASIX 40 MG iv ONCE 5/19    # Diet: Regular  # DVT Prophylaxis: Lovenox BID 40mg   # GI Prophylaxis: Protonix  # Activity: IAT  # Dispo: Acute  # Code Status: Full code
<<<RESIDENT DISCHARGE NOTE>>>     KENYETTA CHEN  MRN-[id number]    VITAL SIGNS:  T(F): 97.2 (05-22-21 @ 08:00), Max: 97.9 (05-21-21 @ 11:45)  HR: 86 (05-22-21 @ 08:00)  BP: 123/69 (05-22-21 @ 08:00)  SpO2: 96% (05-22-21 @ 08:00)      PHYSICAL EXAM:  GENERAL:  NAD  SKIN: No rashes or lesions  HEENT: Atraumatic. Normocephalic.   NECK: Supple, No JVD.   PULMONARY: Coarse breath sounds b/l. No wheezing. No rales  CVS: Normal S1, S2. Rate and Rhythm are regular.   ABDOMEN/GI: Soft, Nontender, Nondistended; BS present  MSK:  No clubbing or cyanosis  NEUROLOGIC:  moves all extremities  PSYCH: Alert & oriented x 3, normal affect      TEST RESULTS:                        15.2   9.25  )-----------( 329      ( 22 May 2021 06:37 )             47.0       05-22    138  |  102  |  21<H>  ----------------------------<  108<H>  5.0   |  26  |  0.8    Ca    8.6      22 May 2021 06:37  Phos  4.1     05-20  Mg     2.4     05-22    TPro  6.1  /  Alb  3.8  /  TBili  0.4  /  DBili  x   /  AST  20  /  ALT  62<H>  /  AlkPhos  46  05-21      FINAL DISCHARGE INTERVIEW:  Resident(s) Present: Dr. Torres, RN Present: (Name:  ___________)    DISCHARGE MEDICATION RECONCILIATION  reviewed with Attending Dr. Mancera  DISPOSITION:   [ x ] Home,    [  ] Home with Visiting Nursing Services,   [    ]  SNF/ NH,    [   ] Acute Rehab (4A),   [   ] Other (Specify:_________)
Transfer Note: CEU  to MICU    Transfer from: CEU     Transfer to: (  ) Medicine    (  ) Telemetry    (  ) RCU                               (  ) Palliative    (  ) Stroke Unit    ( x ) MICU    (  ) __________________    Accepting Physician: Dr Li    Signout given to:     HPI / HOSPITAL COURSE:  25 years old male with a pmhx of asthma, on day 9 of Covid symptoms presents today for eval of worsening SOB for past 4 days. Patient was placed on 6L NC in ED, overnight patient desaturated and was switched to 10L NRB. This morning saturating 94% on NRB. was placed on RDV and dexamethasone. Patient with worsening CXR and continues to be on NRB saturating low 90s, decision made to give TOCI 800mg x1 (5/16) and transfer to ICU per Dr Li for close monitoring.           Vital Signs Last 24 Hrs  T(C): 38.4 (16 May 2021 08:09), Max: 38.8 (16 May 2021 03:28)  T(F): 101.1 (16 May 2021 08:09), Max: 101.8 (16 May 2021 03:28)  HR: 106 (16 May 2021 08:09) (95 - 109)  BP: 126/61 (16 May 2021 08:09) (126/61 - 142/69)  BP(mean): --  RR: 24 (16 May 2021 08:09) (18 - 55)  SpO2: 94% (16 May 2021 08:09) (92% - 98%)    I&O's Summary    15 May 2021 07:01  -  16 May 2021 07:00  --------------------------------------------------------  IN: 0 mL / OUT: 600 mL / NET: -600 mL        Physical Exam:   General: NAD, awake and alert  Cardiac: RRR, no murmur, no rub, no gallop  Respiratory: Good air exchange bilaterally, no wheezes, no crackles  GI: Abdomen nondistended, nontender, bowel sounds present  Neuro: AAOx3, no tremors, no fasciculations     LABS:   CARDIAC MARKERS ( 15 May 2021 08:37 )  x     / <0.01 ng/mL / x     / x     / x                                  14.2   10.53 )-----------( 293      ( 16 May 2021 08:57 )             43.8       05-15    138  |  102  |  14  ----------------------------<  168<H>  3.9   |  22  |  0.8    Ca    8.8      15 May 2021 08:37  Mg     1.8     05-15    TPro  6.2  /  Alb  3.7  /  TBili  0.3  /  DBili  x   /  AST  19  /  ALT  22  /  AlkPhos  40  05-15      PT/INR - ( 15 May 2021 08:37 )   PT: 15.80 sec;   INR: 1.37 ratio         PTT - ( 15 May 2021 08:37 )  PTT:28.4 sec    ABG - ( 16 May 2021 04:16 )  pH, Arterial: 7.43  pH, Blood: x     /  pCO2: 38    /  pO2: 59    / HCO3: 26    / Base Excess: 1.5   /  SaO2: 94                    ECG:    Telemetry:    Imaging:      ASSESSMENT & PLAN:   25 years old male with a pmhx of asthma, on day 9 of Covid symptoms presents today for eval of worsening SOB for 4 days.     Impression:  Severe Covid PNA  Acute Asthma exacerbation    # SOB / moderate - severe COVID - 19 PNA  # Acute asthma exacerbation  - CXR shows b/l pulmonary opacities worsening  - repeat D-Dimer 222, ferritin 508, CRP 80, Procal 0.13 noted.  - now on NRB saturating 94%  - monitor O2, titrate O2 as needed to goal of 92 - 96%  - decadron 6mg IV upto 10 days  - remdesivir 200mg IV x 1 day followed by 100mg IV x 4 days  - anticoagulation: lovenox 40 sq qd - increased to BID (BMI >30)   - tylenol for fever  - seen by ID, 800mg toci x1 5/16/21. COVID antibody +  - c/w inhalers  - trend D-Dimer, CRP, ferritin q48hrs  - seen pulm, approved for MICU transfer by Dr. Li    # Diet: Regular  # DVT Prophylaxis: Lovenox BID 40mg   # GI Prophylaxis: Protonix  # Activity: IAT  # Dispo: Acute  # Code Status: Fullcode      FOR FOLLOW UP: wean o2, f/u inflammatory markers q48 hrs    Mac Reed MD, PGY1
Transfer Note: Medical Floor to Intensive Care     Transfer from: Medical Floor    Transfer to: (  ) Medicine    (  ) Telemetry    (  ) RCU                               (  ) Palliative    (  ) Stroke Unit    ( x ) MICU    (  ) __________________      HPI / HOSPITAL COURSE:    25 years old male with a pmhx of asthma, on day 9 of Covid symptoms presents today for eval of worsening SOB for past 4 days. Patient was placed on 6L NC in ED, overnight patient desaturated and was switched to 10L NRB. This morning saturating 94% on NRB. Patient was seen by ID with recommendations for RDV and Dexa for now. Not a candidate for Toci for now. Patient was seen by pulmonary and approved for MICU.      Vital Signs Last 24 Hrs  T(C): 37.1 (15 May 2021 08:56), Max: 39 (15 May 2021 01:11)  T(F): 98.7 (15 May 2021 08:56), Max: 102.2 (15 May 2021 01:11)  HR: 107 (15 May 2021 05:39) (106 - 127)  BP: 146/67 (15 May 2021 05:39) (121/60 - 146/67)  BP(mean): --  RR: 20 (15 May 2021 05:39) (20 - 22)  SpO2: 94% (15 May 2021 08:05) (90% - 96%)    I&O's Summary      Physical Exam:   General: NAD, awake and alert  Cardiac: RRR, no murmur, no rub, no gallop  Respiratory: Good air exchange bilaterally, no wheezes, no crackles  GI: Abdomen nondistended, nontender, bowel sounds present  Neuro: AAOx3, no tremors, no fasciculations     LABS:   CARDIAC MARKERS ( 15 May 2021 08:37 )  x     / <0.01 ng/mL / x     / x     / x                                  13.7   11.10 )-----------( 233      ( 15 May 2021 08:37 )             40.7       05-15    138  |  102  |  14  ----------------------------<  168<H>  3.9   |  22  |  0.8    Ca    8.8      15 May 2021 08:37  Mg     1.8     05-15    TPro  6.2  /  Alb  3.7  /  TBili  0.3  /  DBili  x   /  AST  19  /  ALT  22  /  AlkPhos  40  05-15      PT/INR - ( 15 May 2021 08:37 )   PT: 15.80 sec;   INR: 1.37 ratio         PTT - ( 15 May 2021 08:37 )  PTT:28.4 sec          ECG:    Telemetry:    Imaging:      ASSESSMENT & PLAN:     25 years old male with a pmhx of asthma, on day 9 of Covid symptoms presents today for eval of worsening SOB for past 4 days.     Impression:  Severe Covid PNA  Acute Asthma exacerbation    # SOB / moderate - severe COVID - 19 PNA  # Acute asthma exacerbation  - CXR shows b/l pulmonary opacities  - repeat D-Dimer 222, ferritin 508, CRP 80, Procal 0.13 noted.  - now on 10L NRB saturating 94%  - monitor O2, titrate O2 as needed to goal of 92 - 96%  - decadron 6mg IV upto 10 days  - remdesivir 200mg IV x 1 day followed by 100mg IV x 4 days  - anticoagulation: lovenox 40 sq qd  - tylenol for fever  - seen by ID, not candidate for Toci for now. COVID antibody pending.  - c/w inhalers  - trend D-Dimer, CRP, LDH, ferritin   - seen pulm, approved for MICU upgrade by Dr. Li    # Diet: Regular  # DVT Prophylaxis: Lovenox  # GI Prophylaxis: Protonix  # Activity: IAT  # IV Fluids: NS @ 75cc d/karen.  # Dispo: Acute  # Code Status: Fullcode    FOR FOLLOW UP:

## 2021-05-22 NOTE — PROGRESS NOTE ADULT - PROVIDER SPECIALTY LIST ADULT
Infectious Disease
Internal Medicine
Internal Medicine
Pulmonology
Hospitalist
Internal Medicine
MICU
Pulmonology
Critical Care
Critical Care
MICU
Critical Care
Critical Care
Internal Medicine
MICU
Infectious Disease

## 2021-05-22 NOTE — PROGRESS NOTE ADULT - NSICDXPILOT_GEN_ALL_CORE
[place]

## 2021-05-23 RX ORDER — DEXAMETHASONE 0.5 MG/5ML
1 ELIXIR ORAL
Qty: 2 | Refills: 0
Start: 2021-05-23 | End: 2021-05-24

## 2021-05-24 PROBLEM — J45.909 UNSPECIFIED ASTHMA, UNCOMPLICATED: Chronic | Status: ACTIVE | Noted: 2021-05-14

## 2021-05-25 RX ORDER — DEXAMETHASONE 0.5 MG/5ML
1 ELIXIR ORAL
Qty: 3 | Refills: 0
Start: 2021-05-25 | End: 2021-05-27

## 2021-05-28 RX ORDER — DEXAMETHASONE 0.5 MG/5ML
1 ELIXIR ORAL
Qty: 3 | Refills: 0
Start: 2021-05-28 | End: 2021-05-30

## 2021-06-07 ENCOUNTER — APPOINTMENT (OUTPATIENT)
Age: 26
End: 2021-06-07
Payer: COMMERCIAL

## 2021-06-07 VITALS
WEIGHT: 240 LBS | DIASTOLIC BLOOD PRESSURE: 80 MMHG | RESPIRATION RATE: 14 BRPM | OXYGEN SATURATION: 93 % | HEART RATE: 99 BPM | HEIGHT: 71 IN | SYSTOLIC BLOOD PRESSURE: 122 MMHG | BODY MASS INDEX: 33.6 KG/M2

## 2021-06-07 PROBLEM — Z00.00 ENCOUNTER FOR PREVENTIVE HEALTH EXAMINATION: Status: ACTIVE | Noted: 2021-06-07

## 2021-06-07 PROCEDURE — 71046 X-RAY EXAM CHEST 2 VIEWS: CPT

## 2021-06-07 PROCEDURE — 99072 ADDL SUPL MATRL&STAF TM PHE: CPT

## 2021-06-07 PROCEDURE — 99213 OFFICE O/P EST LOW 20 MIN: CPT | Mod: 25

## 2021-06-07 NOTE — HISTORY OF PRESENT ILLNESS
[Productive Cough] : no productive cough [Dyspnea] : dyspnea [Follow-Up - From Hospitalization] : follow-up of a hospitalization for [Excessive Daytime Sleepiness] : excessive daytime sleepiness [Snoring] : snoring [Unrefreshing Sleep] : unrefreshing sleep [Sleepy When Sedentary] : sleepy when sedentary [Currently Experiencing] : The patient is currently experiencing symptoms. [None] : No associated symptoms are reported

## 2021-06-07 NOTE — PROCEDURE
[FreeTextEntry1] : CXR PA and Lateral \par The costophrenic and cardiophrenic angles are sharp\par The gildardo parenchyma shows no infiltrates, consolidations, or nodules.  SOme minor bibasilar atelectasis \par The Mediastinum is within normal limits\par No pleural effusions\par

## 2021-06-07 NOTE — ASSESSMENT
Left message informing patient will need to get refill from Dr. Xochilt Loera. Informed to call to establish care with new pcp. [FreeTextEntry1] : SP COVID 19 pneumonia \par Possible SENG HST inconclusive

## 2021-07-19 ENCOUNTER — APPOINTMENT (OUTPATIENT)
Age: 26
End: 2021-07-19

## 2021-07-19 VITALS
WEIGHT: 245 LBS | DIASTOLIC BLOOD PRESSURE: 82 MMHG | BODY MASS INDEX: 34.3 KG/M2 | HEIGHT: 71 IN | SYSTOLIC BLOOD PRESSURE: 128 MMHG | OXYGEN SATURATION: 97 % | RESPIRATION RATE: 14 BRPM | HEART RATE: 90 BPM

## 2021-07-26 ENCOUNTER — OUTPATIENT (OUTPATIENT)
Dept: OUTPATIENT SERVICES | Facility: HOSPITAL | Age: 26
LOS: 1 days | Discharge: HOME | End: 2021-07-26
Payer: COMMERCIAL

## 2021-07-26 PROCEDURE — 95806 SLEEP STUDY UNATT&RESP EFFT: CPT | Mod: 26

## 2021-07-27 DIAGNOSIS — G47.33 OBSTRUCTIVE SLEEP APNEA (ADULT) (PEDIATRIC): ICD-10-CM

## 2021-08-23 ENCOUNTER — LABORATORY RESULT (OUTPATIENT)
Age: 26
End: 2021-08-23

## 2021-08-23 ENCOUNTER — OUTPATIENT (OUTPATIENT)
Dept: OUTPATIENT SERVICES | Facility: HOSPITAL | Age: 26
LOS: 1 days | Discharge: HOME | End: 2021-08-23

## 2021-08-23 DIAGNOSIS — Z11.59 ENCOUNTER FOR SCREENING FOR OTHER VIRAL DISEASES: ICD-10-CM

## 2021-09-21 NOTE — ED ADULT TRIAGE NOTE - MEANS OF ARRIVAL
phenergan      Last Written Prescription Date:  7/21/21  Last Fill Quantity: 90,   # refills: 1  Last Office Visit: 8/4/21  Future Office visit:       
ambulatory

## 2022-03-22 NOTE — PROGRESS NOTE ADULT - LYMPH NODES
Impression: Vitreous degeneration, bilateral: H43.813. Plan: Stable and asymptomatic. S/S of RD discussed with patient.
No lymphadedenopathy

## 2022-10-27 ENCOUNTER — NON-APPOINTMENT (OUTPATIENT)
Age: 27
End: 2022-10-27

## 2022-11-09 ENCOUNTER — NON-APPOINTMENT (OUTPATIENT)
Age: 27
End: 2022-11-09

## 2023-01-11 ENCOUNTER — APPOINTMENT (OUTPATIENT)
Age: 28
End: 2023-01-11
Payer: COMMERCIAL

## 2023-01-11 VITALS
OXYGEN SATURATION: 99 % | RESPIRATION RATE: 12 BRPM | HEART RATE: 91 BPM | DIASTOLIC BLOOD PRESSURE: 80 MMHG | SYSTOLIC BLOOD PRESSURE: 140 MMHG | HEIGHT: 71 IN | BODY MASS INDEX: 36.68 KG/M2 | WEIGHT: 262 LBS

## 2023-01-11 PROCEDURE — 71046 X-RAY EXAM CHEST 2 VIEWS: CPT

## 2023-01-11 PROCEDURE — 99214 OFFICE O/P EST MOD 30 MIN: CPT | Mod: 25

## 2023-01-11 NOTE — PROCEDURE
[FreeTextEntry1] : CXR PA and Lateral \par The costophrenic and cardiophrenic angles are sharp\par The gildardo parenchyma shows no infiltrates, consolidations, or nodules. \par The Mediastinum is within normal limits\par No pleural effusions\par

## 2023-01-11 NOTE — ASSESSMENT
[FreeTextEntry1] : SP COVID 19 pneumonia \par Moderate SENG on HST.  DId not follow up.  NOw pursuing therapy \par INtermittent asthma

## 2023-01-11 NOTE — HISTORY OF PRESENT ILLNESS
[Follow-Up - From Hospitalization] : follow-up of a hospitalization for [Excessive Daytime Sleepiness] : excessive daytime sleepiness [Snoring] : snoring [Unrefreshing Sleep] : unrefreshing sleep [Sleepy When Sedentary] : sleepy when sedentary [Currently Experiencing] : The patient is currently experiencing symptoms. [Intermittent] : intermittent [Doing Well] : doing well [Well Controlled] : Well controlled [Checks Regularly] : The patient checks ~his/her~ peak flow regularly [Good Control] : peak flow has been good [None] : The patient is not currently on any medications for ~his/her~ asthma [Goals--Doing Well] : the patient is doing well with ~his/her~ asthma goals [Productive Cough] : no productive cough [Dyspnea] : no dyspnea

## 2023-03-02 ENCOUNTER — OUTPATIENT (OUTPATIENT)
Dept: OUTPATIENT SERVICES | Facility: HOSPITAL | Age: 28
LOS: 1 days | Discharge: HOME | End: 2023-03-02
Payer: COMMERCIAL

## 2023-03-02 ENCOUNTER — APPOINTMENT (OUTPATIENT)
Dept: SLEEP CENTER | Facility: HOSPITAL | Age: 28
End: 2023-03-02
Payer: COMMERCIAL

## 2023-03-02 DIAGNOSIS — G47.33 OBSTRUCTIVE SLEEP APNEA (ADULT) (PEDIATRIC): ICD-10-CM

## 2023-03-02 PROCEDURE — 95800 SLP STDY UNATTENDED: CPT | Mod: 26

## 2023-03-02 PROCEDURE — 95800 SLP STDY UNATTENDED: CPT

## 2023-03-04 DIAGNOSIS — G47.33 OBSTRUCTIVE SLEEP APNEA (ADULT) (PEDIATRIC): ICD-10-CM

## 2023-03-20 ENCOUNTER — APPOINTMENT (OUTPATIENT)
Dept: PULMONOLOGY | Facility: CLINIC | Age: 28
End: 2023-03-20
Payer: COMMERCIAL

## 2023-03-20 VITALS
WEIGHT: 245 LBS | SYSTOLIC BLOOD PRESSURE: 120 MMHG | HEART RATE: 86 BPM | RESPIRATION RATE: 14 BRPM | DIASTOLIC BLOOD PRESSURE: 80 MMHG | BODY MASS INDEX: 34.3 KG/M2 | HEIGHT: 71 IN | OXYGEN SATURATION: 97 %

## 2023-03-20 DIAGNOSIS — J12.9 VIRAL PNEUMONIA, UNSPECIFIED: ICD-10-CM

## 2023-03-20 DIAGNOSIS — G47.33 OBSTRUCTIVE SLEEP APNEA (ADULT) (PEDIATRIC): ICD-10-CM

## 2023-03-20 PROCEDURE — 99214 OFFICE O/P EST MOD 30 MIN: CPT

## 2023-03-20 NOTE — ASSESSMENT
[FreeTextEntry1] : SP COVID 19 pneumonia \par Moderate SENG on HST.  DId not follow up.  NOw pursuing therapy \par Repeat HST remains with moderate SENG.  AHI 15.1 \par INtermittent asthma well compensated

## 2023-03-20 NOTE — HISTORY OF PRESENT ILLNESS
[Intermittent] : intermittent [Doing Well] : doing well [Well Controlled] : Well controlled [Checks Regularly] : The patient checks ~his/her~ peak flow regularly [Good Control] : peak flow has been good [Goals--Doing Well] : the patient is doing well with ~his/her~ asthma goals [Productive Cough] : no productive cough [Dyspnea] : no dyspnea [Follow-Up - From Hospitalization] : follow-up of a hospitalization for [Excessive Daytime Sleepiness] : excessive daytime sleepiness [Snoring] : snoring [Unrefreshing Sleep] : unrefreshing sleep [Sleepy When Sedentary] : sleepy when sedentary [Currently Experiencing] : The patient is currently experiencing symptoms. [None] : No associated symptoms are reported

## 2023-08-07 ENCOUNTER — APPOINTMENT (OUTPATIENT)
Dept: PULMONOLOGY | Facility: CLINIC | Age: 28
End: 2023-08-07

## 2025-01-18 NOTE — DISCHARGE NOTE NURSING/CASE MANAGEMENT/SOCIAL WORK - NSTRANSFERBELONGINGSDISPO_GEN_A_NUR
Pt bleeding appears to be controlled, current nasal packing remains in place with some slight drainage around packing.     Pt RR easy, unlabored.    not applicable